# Patient Record
Sex: MALE | Race: WHITE | NOT HISPANIC OR LATINO
[De-identification: names, ages, dates, MRNs, and addresses within clinical notes are randomized per-mention and may not be internally consistent; named-entity substitution may affect disease eponyms.]

---

## 2017-12-05 ENCOUNTER — HOSPITAL (OUTPATIENT)
Dept: OTHER | Age: 47
End: 2017-12-05
Attending: FAMILY MEDICINE

## 2018-05-15 RX ORDER — SIMVASTATIN 10 MG
10 TABLET ORAL NIGHTLY
COMMUNITY

## 2018-05-15 RX ORDER — MULTIVIT-MIN/IRON FUM/FOLIC AC 7.5 MG-4
1 TABLET ORAL DAILY
COMMUNITY

## 2018-05-16 ENCOUNTER — LAB ENCOUNTER (OUTPATIENT)
Dept: LAB | Facility: HOSPITAL | Age: 48
End: 2018-05-16
Attending: ORTHOPAEDIC SURGERY
Payer: OTHER MISCELLANEOUS

## 2018-05-16 ENCOUNTER — HOSPITAL ENCOUNTER (OUTPATIENT)
Dept: GENERAL RADIOLOGY | Facility: HOSPITAL | Age: 48
Discharge: HOME | End: 2018-05-16
Attending: ORTHOPAEDIC SURGERY
Payer: OTHER MISCELLANEOUS

## 2018-05-16 DIAGNOSIS — M48.061 LUMBAR SPINAL STENOSIS: ICD-10-CM

## 2018-05-16 DIAGNOSIS — M54.17 LUMBOSACRAL NEURITIS: ICD-10-CM

## 2018-05-16 DIAGNOSIS — M54.17 LUMBOSACRAL NEURITIS: Primary | ICD-10-CM

## 2018-05-16 DIAGNOSIS — M43.17 ACQUIRED SPONDYLOLISTHESIS OF LUMBOSACRAL REGION: ICD-10-CM

## 2018-05-16 PROCEDURE — 93005 ELECTROCARDIOGRAM TRACING: CPT

## 2018-05-16 PROCEDURE — 86850 RBC ANTIBODY SCREEN: CPT

## 2018-05-16 PROCEDURE — 85730 THROMBOPLASTIN TIME PARTIAL: CPT

## 2018-05-16 PROCEDURE — 85025 COMPLETE CBC W/AUTO DIFF WBC: CPT

## 2018-05-16 PROCEDURE — 71046 X-RAY EXAM CHEST 2 VIEWS: CPT | Performed by: ORTHOPAEDIC SURGERY

## 2018-05-16 PROCEDURE — 81001 URINALYSIS AUTO W/SCOPE: CPT

## 2018-05-16 PROCEDURE — 86900 BLOOD TYPING SEROLOGIC ABO: CPT

## 2018-05-16 PROCEDURE — 85610 PROTHROMBIN TIME: CPT

## 2018-05-16 PROCEDURE — 87081 CULTURE SCREEN ONLY: CPT

## 2018-05-16 PROCEDURE — 80053 COMPREHEN METABOLIC PANEL: CPT

## 2018-05-16 PROCEDURE — 93010 ELECTROCARDIOGRAM REPORT: CPT | Performed by: ORTHOPAEDIC SURGERY

## 2018-05-16 PROCEDURE — 83036 HEMOGLOBIN GLYCOSYLATED A1C: CPT

## 2018-05-16 PROCEDURE — 36415 COLL VENOUS BLD VENIPUNCTURE: CPT

## 2018-05-16 PROCEDURE — 86901 BLOOD TYPING SEROLOGIC RH(D): CPT

## 2018-05-22 ENCOUNTER — OFFICE VISIT (OUTPATIENT)
Dept: INTERNAL MEDICINE CLINIC | Facility: CLINIC | Age: 48
End: 2018-05-22

## 2018-05-22 VITALS
DIASTOLIC BLOOD PRESSURE: 98 MMHG | WEIGHT: 258 LBS | HEART RATE: 114 BPM | SYSTOLIC BLOOD PRESSURE: 162 MMHG | BODY MASS INDEX: 33.11 KG/M2 | HEIGHT: 74 IN

## 2018-05-22 DIAGNOSIS — I10 ESSENTIAL HYPERTENSION: ICD-10-CM

## 2018-05-22 DIAGNOSIS — Z01.818 PREOPERATIVE EXAMINATION: Primary | ICD-10-CM

## 2018-05-22 DIAGNOSIS — E11.69 DYSLIPIDEMIA ASSOCIATED WITH TYPE 2 DIABETES MELLITUS (HCC): ICD-10-CM

## 2018-05-22 DIAGNOSIS — M48.061 SPINAL STENOSIS OF LUMBAR REGION, UNSPECIFIED WHETHER NEUROGENIC CLAUDICATION PRESENT: ICD-10-CM

## 2018-05-22 DIAGNOSIS — N20.0 RECURRENT KIDNEY STONES: ICD-10-CM

## 2018-05-22 DIAGNOSIS — E11.9 TYPE 2 DIABETES MELLITUS WITHOUT COMPLICATION, WITHOUT LONG-TERM CURRENT USE OF INSULIN (HCC): ICD-10-CM

## 2018-05-22 DIAGNOSIS — E78.5 DYSLIPIDEMIA ASSOCIATED WITH TYPE 2 DIABETES MELLITUS (HCC): ICD-10-CM

## 2018-05-22 PROCEDURE — 99244 OFF/OP CNSLTJ NEW/EST MOD 40: CPT | Performed by: INTERNAL MEDICINE

## 2018-05-22 PROCEDURE — 99212 OFFICE O/P EST SF 10 MIN: CPT | Performed by: INTERNAL MEDICINE

## 2018-05-22 RX ORDER — CYCLOBENZAPRINE HYDROCHLORIDE 7.5 MG/1
7.5 TABLET, FILM COATED ORAL EVERY 8 HOURS
COMMUNITY

## 2018-05-22 RX ORDER — OMEPRAZOLE 20 MG/1
20 CAPSULE, DELAYED RELEASE ORAL
COMMUNITY

## 2018-05-22 RX ORDER — MELOXICAM 15 MG/1
15 TABLET ORAL DAILY
Status: ON HOLD | COMMUNITY
End: 2018-06-03

## 2018-05-22 RX ORDER — AMLODIPINE BESYLATE 5 MG/1
1 TABLET ORAL DAILY
COMMUNITY
Start: 2018-05-14

## 2018-05-22 RX ORDER — TRAMADOL HYDROCHLORIDE 150 MG/1
1 CAPSULE, EXTENDED RELEASE ORAL 2 TIMES DAILY
Refills: 0 | Status: ON HOLD | COMMUNITY
Start: 2018-04-30 | End: 2018-06-03

## 2018-05-22 RX ORDER — VALSARTAN AND HYDROCHLOROTHIAZIDE 320; 25 MG/1; MG/1
1 TABLET, FILM COATED ORAL DAILY
COMMUNITY
Start: 2018-03-27

## 2018-05-22 NOTE — PATIENT INSTRUCTIONS
Please stop meloxicam and avoid aspirin and other anti-inflammatories prior to surgery. Please increase amlodipine to 5 mg 2 pills daily. Please try to stop smoking. Medically stable for upcoming lumbar spine surgery.   Follow-up with Dr. Brenda Oconnor postop

## 2018-05-22 NOTE — H&P
Migdalia Nick is a 56year old male smoker with a history of type 2 diabetes, hypertension, dyslipidemia and recurrent kidney stones who is seen today, at Dr. Ame Encinas request, for preoperative medical evaluation and clearance.   He is scheduled to un Outpatient Prescriptions:  Valsartan-Hydrochlorothiazide 320-25 MG Oral Tab Take 1 tablet by mouth daily. Disp:  Rfl:    AmLODIPine Besylate 5 MG Oral Tab Take 1 tablet by mouth daily.  Disp:  Rfl:    TraMADol HCl  MG Oral Capsule SR 24 Hr Take 1 caps pain  GI: Occasional heartburn.   No anorexia dysphagia nausea vomiting abdominal pain diarrhea constipation or rectal bleeding  : No urinary frequency dysuria or hematuria  MUSCULOSKELETAL: No leg swelling  NEURO: No headaches    EXAM:   GENERAL: Pleasan preoperative visit. Ongoing follow-up with usual PCP postoperatively. 2. Spinal stenosis of lumbar region, unspecified whether neurogenic claudication present  Anticipate posterior spinal fusion L5-S1 as above    3.  Type 2 diabetes mellitus without com

## 2018-05-29 ENCOUNTER — HOSPITAL ENCOUNTER (INPATIENT)
Facility: HOSPITAL | Age: 48
LOS: 5 days | Discharge: HOME HEALTH | DRG: 460 | End: 2018-06-03
Attending: ORTHOPAEDIC SURGERY | Admitting: ORTHOPAEDIC SURGERY
Payer: OTHER MISCELLANEOUS

## 2018-05-29 ENCOUNTER — SURGERY (OUTPATIENT)
Age: 48
End: 2018-05-29

## 2018-05-29 ENCOUNTER — ANESTHESIA (OUTPATIENT)
Dept: SURGERY | Facility: HOSPITAL | Age: 48
DRG: 460 | End: 2018-05-29
Payer: OTHER MISCELLANEOUS

## 2018-05-29 ENCOUNTER — ANESTHESIA EVENT (OUTPATIENT)
Dept: SURGERY | Facility: HOSPITAL | Age: 48
DRG: 460 | End: 2018-05-29
Payer: OTHER MISCELLANEOUS

## 2018-05-29 ENCOUNTER — APPOINTMENT (OUTPATIENT)
Dept: GENERAL RADIOLOGY | Facility: HOSPITAL | Age: 48
DRG: 460 | End: 2018-05-29
Attending: ORTHOPAEDIC SURGERY
Payer: OTHER MISCELLANEOUS

## 2018-05-29 DIAGNOSIS — G89.18 POSTOPERATIVE PAIN: ICD-10-CM

## 2018-05-29 DIAGNOSIS — M51.36 LUMBAR DEGENERATIVE DISC DISEASE: ICD-10-CM

## 2018-05-29 DIAGNOSIS — M48.061 LUMBAR SPINAL STENOSIS: Primary | ICD-10-CM

## 2018-05-29 DIAGNOSIS — M54.16 LUMBAR RADICULITIS: ICD-10-CM

## 2018-05-29 DIAGNOSIS — M53.2X6 LUMBAR SPINE INSTABILITY: ICD-10-CM

## 2018-05-29 PROCEDURE — BR1B1ZZ FLUOROSCOPY OF LUMBOSACRAL JOINT USING LOW OSMOLAR CONTRAST: ICD-10-PCS | Performed by: ORTHOPAEDIC SURGERY

## 2018-05-29 PROCEDURE — 82962 GLUCOSE BLOOD TEST: CPT

## 2018-05-29 PROCEDURE — 76001 XR C-ARM FLUORO >1 HOUR  (CPT=76001): CPT | Performed by: ORTHOPAEDIC SURGERY

## 2018-05-29 PROCEDURE — 4A11X4G MONITORING OF PERIPHERAL NERVOUS ELECTRICAL ACTIVITY, INTRAOPERATIVE, EXTERNAL APPROACH: ICD-10-PCS | Performed by: ORTHOPAEDIC SURGERY

## 2018-05-29 PROCEDURE — 01NB0ZZ RELEASE LUMBAR NERVE, OPEN APPROACH: ICD-10-PCS | Performed by: ORTHOPAEDIC SURGERY

## 2018-05-29 PROCEDURE — 99232 SBSQ HOSP IP/OBS MODERATE 35: CPT | Performed by: HOSPITALIST

## 2018-05-29 PROCEDURE — 0SG3071 FUSION OF LUMBOSACRAL JOINT WITH AUTOLOGOUS TISSUE SUBSTITUTE, POSTERIOR APPROACH, POSTERIOR COLUMN, OPEN APPROACH: ICD-10-PCS | Performed by: ORTHOPAEDIC SURGERY

## 2018-05-29 PROCEDURE — 72100 X-RAY EXAM L-S SPINE 2/3 VWS: CPT | Performed by: ORTHOPAEDIC SURGERY

## 2018-05-29 RX ORDER — DIPHENHYDRAMINE HCL 25 MG
25 CAPSULE ORAL EVERY 4 HOURS PRN
Status: DISCONTINUED | OUTPATIENT
Start: 2018-05-29 | End: 2018-06-03

## 2018-05-29 RX ORDER — BISACODYL 10 MG
10 SUPPOSITORY, RECTAL RECTAL
Status: DISCONTINUED | OUTPATIENT
Start: 2018-05-29 | End: 2018-06-03

## 2018-05-29 RX ORDER — VANCOMYCIN HYDROCHLORIDE 500 MG/10ML
INJECTION, POWDER, LYOPHILIZED, FOR SOLUTION INTRAVENOUS CONTINUOUS PRN
Status: DISCONTINUED | OUTPATIENT
Start: 2018-05-29 | End: 2018-05-29

## 2018-05-29 RX ORDER — INSULIN ASPART 100 [IU]/ML
INJECTION, SOLUTION INTRAVENOUS; SUBCUTANEOUS ONCE
Status: DISCONTINUED | OUTPATIENT
Start: 2018-05-29 | End: 2018-05-29

## 2018-05-29 RX ORDER — ONDANSETRON 2 MG/ML
4 INJECTION INTRAMUSCULAR; INTRAVENOUS EVERY 4 HOURS PRN
Status: ACTIVE | OUTPATIENT
Start: 2018-05-29 | End: 2018-05-30

## 2018-05-29 RX ORDER — POLYETHYLENE GLYCOL 3350 17 G/17G
17 POWDER, FOR SOLUTION ORAL DAILY PRN
Status: DISCONTINUED | OUTPATIENT
Start: 2018-05-29 | End: 2018-06-03

## 2018-05-29 RX ORDER — SODIUM CHLORIDE, SODIUM LACTATE, POTASSIUM CHLORIDE, CALCIUM CHLORIDE 600; 310; 30; 20 MG/100ML; MG/100ML; MG/100ML; MG/100ML
INJECTION, SOLUTION INTRAVENOUS CONTINUOUS
Status: DISCONTINUED | OUTPATIENT
Start: 2018-05-29 | End: 2018-05-29 | Stop reason: HOSPADM

## 2018-05-29 RX ORDER — DEXTROSE MONOHYDRATE 50 MG/ML
INJECTION, SOLUTION INTRAVENOUS CONTINUOUS
Status: DISCONTINUED | OUTPATIENT
Start: 2018-05-29 | End: 2018-06-03

## 2018-05-29 RX ORDER — DOCUSATE SODIUM 100 MG/1
100 CAPSULE, LIQUID FILLED ORAL 2 TIMES DAILY
Status: DISCONTINUED | OUTPATIENT
Start: 2018-05-29 | End: 2018-06-03

## 2018-05-29 RX ORDER — METOCLOPRAMIDE HYDROCHLORIDE 5 MG/ML
10 INJECTION INTRAMUSCULAR; INTRAVENOUS EVERY 6 HOURS PRN
Status: DISCONTINUED | OUTPATIENT
Start: 2018-05-29 | End: 2018-06-03

## 2018-05-29 RX ORDER — CEFAZOLIN SODIUM/WATER 2 G/20 ML
2 SYRINGE (ML) INTRAVENOUS EVERY 8 HOURS
Status: COMPLETED | OUTPATIENT
Start: 2018-05-29 | End: 2018-05-29

## 2018-05-29 RX ORDER — DEXTROSE MONOHYDRATE 25 G/50ML
50 INJECTION, SOLUTION INTRAVENOUS AS NEEDED
Status: DISCONTINUED | OUTPATIENT
Start: 2018-05-29 | End: 2018-06-03

## 2018-05-29 RX ORDER — ONDANSETRON 2 MG/ML
4 INJECTION INTRAMUSCULAR; INTRAVENOUS EVERY 6 HOURS PRN
Status: DISCONTINUED | OUTPATIENT
Start: 2018-05-29 | End: 2018-06-03

## 2018-05-29 RX ORDER — AMLODIPINE BESYLATE 5 MG/1
5 TABLET ORAL DAILY
Status: DISCONTINUED | OUTPATIENT
Start: 2018-05-30 | End: 2018-06-03

## 2018-05-29 RX ORDER — HYDROCODONE BITARTRATE AND ACETAMINOPHEN 5; 325 MG/1; MG/1
2 TABLET ORAL AS NEEDED
Status: DISCONTINUED | OUTPATIENT
Start: 2018-05-29 | End: 2018-05-29 | Stop reason: HOSPADM

## 2018-05-29 RX ORDER — METOCLOPRAMIDE 10 MG/1
10 TABLET ORAL ONCE
Status: COMPLETED | OUTPATIENT
Start: 2018-05-29 | End: 2018-05-29

## 2018-05-29 RX ORDER — HYDROCODONE BITARTRATE AND ACETAMINOPHEN 5; 325 MG/1; MG/1
1 TABLET ORAL AS NEEDED
Status: DISCONTINUED | OUTPATIENT
Start: 2018-05-29 | End: 2018-05-29 | Stop reason: HOSPADM

## 2018-05-29 RX ORDER — HYDROMORPHONE HYDROCHLORIDE 1 MG/ML
0.2 INJECTION, SOLUTION INTRAMUSCULAR; INTRAVENOUS; SUBCUTANEOUS EVERY 2 HOUR PRN
Status: DISCONTINUED | OUTPATIENT
Start: 2018-05-29 | End: 2018-06-03

## 2018-05-29 RX ORDER — CYCLOBENZAPRINE HCL 10 MG
10 TABLET ORAL 3 TIMES DAILY PRN
Status: DISCONTINUED | OUTPATIENT
Start: 2018-05-29 | End: 2018-06-03

## 2018-05-29 RX ORDER — CYCLOBENZAPRINE HCL 5 MG
7.5 TABLET ORAL 3 TIMES DAILY PRN
Status: DISCONTINUED | OUTPATIENT
Start: 2018-05-29 | End: 2018-06-03

## 2018-05-29 RX ORDER — ACETAMINOPHEN 500 MG
1000 TABLET ORAL ONCE
Status: COMPLETED | OUTPATIENT
Start: 2018-05-29 | End: 2018-05-29

## 2018-05-29 RX ORDER — ACETAMINOPHEN 325 MG/1
650 TABLET ORAL EVERY 4 HOURS PRN
Status: DISCONTINUED | OUTPATIENT
Start: 2018-05-29 | End: 2018-05-31

## 2018-05-29 RX ORDER — VALSARTAN AND HYDROCHLOROTHIAZIDE 320; 25 MG/1; MG/1
1 TABLET, FILM COATED ORAL DAILY
Status: DISCONTINUED | OUTPATIENT
Start: 2018-05-30 | End: 2018-05-29 | Stop reason: SDUPTHER

## 2018-05-29 RX ORDER — CEFAZOLIN SODIUM/WATER 2 G/20 ML
2 SYRINGE (ML) INTRAVENOUS ONCE
Status: COMPLETED | OUTPATIENT
Start: 2018-05-29 | End: 2018-05-29

## 2018-05-29 RX ORDER — MIDAZOLAM HYDROCHLORIDE 1 MG/ML
INJECTION INTRAMUSCULAR; INTRAVENOUS AS NEEDED
Status: DISCONTINUED | OUTPATIENT
Start: 2018-05-29 | End: 2018-05-29 | Stop reason: SURG

## 2018-05-29 RX ORDER — ACETAMINOPHEN 500 MG
1000 TABLET ORAL ONCE
Status: DISCONTINUED | OUTPATIENT
Start: 2018-05-29 | End: 2018-05-29 | Stop reason: HOSPADM

## 2018-05-29 RX ORDER — ZOLPIDEM TARTRATE 5 MG/1
5 TABLET ORAL NIGHTLY PRN
Status: DISCONTINUED | OUTPATIENT
Start: 2018-05-29 | End: 2018-06-03

## 2018-05-29 RX ORDER — HYDROMORPHONE HYDROCHLORIDE 1 MG/ML
0.6 INJECTION, SOLUTION INTRAMUSCULAR; INTRAVENOUS; SUBCUTANEOUS EVERY 5 MIN PRN
Status: DISCONTINUED | OUTPATIENT
Start: 2018-05-29 | End: 2018-05-29 | Stop reason: HOSPADM

## 2018-05-29 RX ORDER — HYDROCODONE BITARTRATE AND ACETAMINOPHEN 10; 325 MG/1; MG/1
1 TABLET ORAL EVERY 4 HOURS PRN
Status: DISCONTINUED | OUTPATIENT
Start: 2018-05-29 | End: 2018-05-31

## 2018-05-29 RX ORDER — FAMOTIDINE 20 MG/1
20 TABLET ORAL ONCE
Status: COMPLETED | OUTPATIENT
Start: 2018-05-29 | End: 2018-05-29

## 2018-05-29 RX ORDER — HYDROMORPHONE HYDROCHLORIDE 1 MG/ML
0.8 INJECTION, SOLUTION INTRAMUSCULAR; INTRAVENOUS; SUBCUTANEOUS EVERY 2 HOUR PRN
Status: DISCONTINUED | OUTPATIENT
Start: 2018-05-29 | End: 2018-06-03

## 2018-05-29 RX ORDER — DIPHENHYDRAMINE HYDROCHLORIDE 50 MG/ML
25 INJECTION INTRAMUSCULAR; INTRAVENOUS EVERY 4 HOURS PRN
Status: DISCONTINUED | OUTPATIENT
Start: 2018-05-29 | End: 2018-06-03

## 2018-05-29 RX ORDER — DIPHENHYDRAMINE HYDROCHLORIDE 50 MG/ML
12.5 INJECTION INTRAMUSCULAR; INTRAVENOUS EVERY 4 HOURS PRN
Status: DISCONTINUED | OUTPATIENT
Start: 2018-05-29 | End: 2018-06-03

## 2018-05-29 RX ORDER — NALBUPHINE HCL 10 MG/ML
2.5 AMPUL (ML) INJECTION EVERY 4 HOURS PRN
Status: DISCONTINUED | OUTPATIENT
Start: 2018-05-29 | End: 2018-06-03

## 2018-05-29 RX ORDER — SODIUM PHOSPHATE, DIBASIC AND SODIUM PHOSPHATE, MONOBASIC 7; 19 G/133ML; G/133ML
1 ENEMA RECTAL ONCE AS NEEDED
Status: DISCONTINUED | OUTPATIENT
Start: 2018-05-29 | End: 2018-06-03

## 2018-05-29 RX ORDER — HYDROMORPHONE HYDROCHLORIDE 1 MG/ML
0.4 INJECTION, SOLUTION INTRAMUSCULAR; INTRAVENOUS; SUBCUTANEOUS EVERY 5 MIN PRN
Status: DISCONTINUED | OUTPATIENT
Start: 2018-05-29 | End: 2018-05-29 | Stop reason: HOSPADM

## 2018-05-29 RX ORDER — NALOXONE HYDROCHLORIDE 0.4 MG/ML
0.08 INJECTION, SOLUTION INTRAMUSCULAR; INTRAVENOUS; SUBCUTANEOUS
Status: DISCONTINUED | OUTPATIENT
Start: 2018-05-29 | End: 2018-06-03

## 2018-05-29 RX ORDER — HYDROMORPHONE HYDROCHLORIDE 1 MG/ML
0.2 INJECTION, SOLUTION INTRAMUSCULAR; INTRAVENOUS; SUBCUTANEOUS EVERY 5 MIN PRN
Status: DISCONTINUED | OUTPATIENT
Start: 2018-05-29 | End: 2018-05-29 | Stop reason: HOSPADM

## 2018-05-29 RX ORDER — PANTOPRAZOLE SODIUM 20 MG/1
20 TABLET, DELAYED RELEASE ORAL
Status: DISCONTINUED | OUTPATIENT
Start: 2018-05-30 | End: 2018-06-03

## 2018-05-29 RX ORDER — SODIUM CHLORIDE, SODIUM LACTATE, POTASSIUM CHLORIDE, CALCIUM CHLORIDE 600; 310; 30; 20 MG/100ML; MG/100ML; MG/100ML; MG/100ML
INJECTION, SOLUTION INTRAVENOUS CONTINUOUS
Status: DISCONTINUED | OUTPATIENT
Start: 2018-05-29 | End: 2018-06-03

## 2018-05-29 RX ORDER — HYDROCODONE BITARTRATE AND ACETAMINOPHEN 10; 325 MG/1; MG/1
2 TABLET ORAL EVERY 4 HOURS PRN
Status: DISCONTINUED | OUTPATIENT
Start: 2018-05-29 | End: 2018-05-31

## 2018-05-29 RX ORDER — ONDANSETRON 2 MG/ML
INJECTION INTRAMUSCULAR; INTRAVENOUS AS NEEDED
Status: DISCONTINUED | OUTPATIENT
Start: 2018-05-29 | End: 2018-05-29 | Stop reason: SURG

## 2018-05-29 RX ORDER — SENNOSIDES 8.6 MG
17.2 TABLET ORAL NIGHTLY
Status: DISCONTINUED | OUTPATIENT
Start: 2018-05-29 | End: 2018-06-03

## 2018-05-29 RX ORDER — HYDROMORPHONE HYDROCHLORIDE 1 MG/ML
0.4 INJECTION, SOLUTION INTRAMUSCULAR; INTRAVENOUS; SUBCUTANEOUS EVERY 30 MIN PRN
Status: DISCONTINUED | OUTPATIENT
Start: 2018-05-29 | End: 2018-06-03

## 2018-05-29 RX ORDER — ROCURONIUM BROMIDE 10 MG/ML
INJECTION, SOLUTION INTRAVENOUS AS NEEDED
Status: DISCONTINUED | OUTPATIENT
Start: 2018-05-29 | End: 2018-05-29 | Stop reason: SURG

## 2018-05-29 RX ORDER — NALOXONE HYDROCHLORIDE 0.4 MG/ML
80 INJECTION, SOLUTION INTRAMUSCULAR; INTRAVENOUS; SUBCUTANEOUS AS NEEDED
Status: DISCONTINUED | OUTPATIENT
Start: 2018-05-29 | End: 2018-05-29 | Stop reason: HOSPADM

## 2018-05-29 RX ORDER — HALOPERIDOL 5 MG/ML
0.25 INJECTION INTRAMUSCULAR ONCE AS NEEDED
Status: DISCONTINUED | OUTPATIENT
Start: 2018-05-29 | End: 2018-05-29 | Stop reason: HOSPADM

## 2018-05-29 RX ORDER — PHENYLEPHRINE HCL 10 MG/ML
VIAL (ML) INJECTION AS NEEDED
Status: DISCONTINUED | OUTPATIENT
Start: 2018-05-29 | End: 2018-05-29 | Stop reason: SURG

## 2018-05-29 RX ORDER — HYDROMORPHONE HYDROCHLORIDE 1 MG/ML
0.4 INJECTION, SOLUTION INTRAMUSCULAR; INTRAVENOUS; SUBCUTANEOUS EVERY 2 HOUR PRN
Status: DISCONTINUED | OUTPATIENT
Start: 2018-05-29 | End: 2018-06-03

## 2018-05-29 RX ORDER — LIDOCAINE HYDROCHLORIDE 10 MG/ML
INJECTION, SOLUTION EPIDURAL; INFILTRATION; INTRACAUDAL; PERINEURAL AS NEEDED
Status: DISCONTINUED | OUTPATIENT
Start: 2018-05-29 | End: 2018-05-29 | Stop reason: SURG

## 2018-05-29 RX ORDER — DEXTROSE MONOHYDRATE 25 G/50ML
50 INJECTION, SOLUTION INTRAVENOUS
Status: DISCONTINUED | OUTPATIENT
Start: 2018-05-29 | End: 2018-05-29

## 2018-05-29 RX ORDER — SODIUM CHLORIDE 9 MG/ML
INJECTION, SOLUTION INTRAVENOUS
Status: COMPLETED
Start: 2018-05-29 | End: 2018-05-29

## 2018-05-29 RX ORDER — ONDANSETRON 2 MG/ML
4 INJECTION INTRAMUSCULAR; INTRAVENOUS ONCE AS NEEDED
Status: DISCONTINUED | OUTPATIENT
Start: 2018-05-29 | End: 2018-05-29 | Stop reason: HOSPADM

## 2018-05-29 RX ORDER — LIDOCAINE HYDROCHLORIDE 40 MG/ML
SOLUTION TOPICAL AS NEEDED
Status: DISCONTINUED | OUTPATIENT
Start: 2018-05-29 | End: 2018-05-29 | Stop reason: SURG

## 2018-05-29 RX ORDER — INSULIN ASPART 100 [IU]/ML
INJECTION, SOLUTION INTRAVENOUS; SUBCUTANEOUS ONCE
Status: DISCONTINUED | OUTPATIENT
Start: 2018-05-29 | End: 2018-05-30 | Stop reason: ALTCHOICE

## 2018-05-29 RX ADMIN — MIDAZOLAM HYDROCHLORIDE 2 MG: 1 INJECTION INTRAMUSCULAR; INTRAVENOUS at 07:34:00

## 2018-05-29 RX ADMIN — HYDROMORPHONE HYDROCHLORIDE 0.4 MG: 1 INJECTION, SOLUTION INTRAMUSCULAR; INTRAVENOUS; SUBCUTANEOUS at 09:28:00

## 2018-05-29 RX ADMIN — ONDANSETRON 4 MG: 2 INJECTION INTRAMUSCULAR; INTRAVENOUS at 09:22:00

## 2018-05-29 RX ADMIN — SODIUM CHLORIDE, SODIUM LACTATE, POTASSIUM CHLORIDE, CALCIUM CHLORIDE: 600; 310; 30; 20 INJECTION, SOLUTION INTRAVENOUS at 09:35:00

## 2018-05-29 RX ADMIN — CEFAZOLIN SODIUM/WATER 2 G: 2 G/20 ML SYRINGE (ML) INTRAVENOUS at 07:43:00

## 2018-05-29 RX ADMIN — PHENYLEPHRINE HCL 100 MCG: 10 MG/ML VIAL (ML) INJECTION at 08:25:00

## 2018-05-29 RX ADMIN — SODIUM CHLORIDE, SODIUM LACTATE, POTASSIUM CHLORIDE, CALCIUM CHLORIDE: 600; 310; 30; 20 INJECTION, SOLUTION INTRAVENOUS at 07:34:00

## 2018-05-29 RX ADMIN — PHENYLEPHRINE HCL 100 MCG: 10 MG/ML VIAL (ML) INJECTION at 08:18:00

## 2018-05-29 RX ADMIN — SODIUM CHLORIDE, SODIUM LACTATE, POTASSIUM CHLORIDE, CALCIUM CHLORIDE: 600; 310; 30; 20 INJECTION, SOLUTION INTRAVENOUS at 10:02:00

## 2018-05-29 RX ADMIN — LIDOCAINE HYDROCHLORIDE 50 MG: 10 INJECTION, SOLUTION EPIDURAL; INFILTRATION; INTRACAUDAL; PERINEURAL at 07:37:00

## 2018-05-29 RX ADMIN — ROCURONIUM BROMIDE 10 MG: 10 INJECTION, SOLUTION INTRAVENOUS at 07:37:00

## 2018-05-29 RX ADMIN — PHENYLEPHRINE HCL 200 MCG: 10 MG/ML VIAL (ML) INJECTION at 09:10:00

## 2018-05-29 RX ADMIN — HYDROMORPHONE HYDROCHLORIDE 0.2 MG: 1 INJECTION, SOLUTION INTRAMUSCULAR; INTRAVENOUS; SUBCUTANEOUS at 08:10:00

## 2018-05-29 RX ADMIN — HYDROMORPHONE HYDROCHLORIDE 0.2 MG: 1 INJECTION, SOLUTION INTRAMUSCULAR; INTRAVENOUS; SUBCUTANEOUS at 08:06:00

## 2018-05-29 RX ADMIN — PHENYLEPHRINE HCL 200 MCG: 10 MG/ML VIAL (ML) INJECTION at 08:45:00

## 2018-05-29 RX ADMIN — PHENYLEPHRINE HCL 100 MCG: 10 MG/ML VIAL (ML) INJECTION at 08:30:00

## 2018-05-29 RX ADMIN — LIDOCAINE HYDROCHLORIDE 4 ML: 40 SOLUTION TOPICAL at 07:37:00

## 2018-05-29 RX ADMIN — HYDROMORPHONE HYDROCHLORIDE 0.2 MG: 1 INJECTION, SOLUTION INTRAMUSCULAR; INTRAVENOUS; SUBCUTANEOUS at 08:00:00

## 2018-05-29 RX ADMIN — SODIUM CHLORIDE, SODIUM LACTATE, POTASSIUM CHLORIDE, CALCIUM CHLORIDE: 600; 310; 30; 20 INJECTION, SOLUTION INTRAVENOUS at 08:15:00

## 2018-05-29 RX ADMIN — ROCURONIUM BROMIDE 10 MG: 10 INJECTION, SOLUTION INTRAVENOUS at 08:10:00

## 2018-05-29 RX ADMIN — PHENYLEPHRINE HCL 100 MCG: 10 MG/ML VIAL (ML) INJECTION at 08:38:00

## 2018-05-29 RX ADMIN — HYDROMORPHONE HYDROCHLORIDE 0.2 MG: 1 INJECTION, SOLUTION INTRAMUSCULAR; INTRAVENOUS; SUBCUTANEOUS at 09:30:00

## 2018-05-29 NOTE — ANESTHESIA PREPROCEDURE EVALUATION
Anesthesia PreOp Note    HPI:     Shonda Vegas is a 56year old male who presents for preoperative consultation requested by: Ilya Ying MD    Date of Surgery: 5/29/2018    Procedure(s):  POSTERIOR LUMBAR LAMINECT SPINAL FUS W/INSTR 1 LEV  Indic meals. Take 2 pills twice daily Disp:  Rfl:  5/28/2018 at Unknown time   simvastatin 10 MG Oral Tab Take 10 mg by mouth nightly.  Disp:  Rfl:  5/28/2018 at Unknown time   Dulaglutide (TRULICITY) 1.5 AG/8.3ZS Subcutaneous Solution Pen-injector Inject into th Weight: 111.1 kg (245 lb)   Height: 1.905 m (6' 3\")        Anesthesia ROS/Med Hx and Physical Exam     Patient summary reviewed and Nursing notes reviewed    Airway   Mallampati: II  TM distance: >3 FB  Neck ROM: full  Dental    (+) partials    Pulmonar

## 2018-05-29 NOTE — PROGRESS NOTES
05/29/18 1345   Clinical Encounter Type   Visited With Patient and family together   Routine Visit Introduction   Continue Visiting Yes   Surgical Visit Post-op   Crisis Visit Critical care   Patient Spiritual Encounters   Spiritual Assessment Completed

## 2018-05-29 NOTE — ANESTHESIA POSTPROCEDURE EVALUATION
Patient: Omar Clifford    Procedure Summary     Date:  05/29/18 Room / Location:  [address] [place] [address] MAIN OR 09 / 300 [facility] MAIN OR    Anesthesia Start:  9476 Anesthesia Stop:  1003    Procedure:  POSTERIOR LUMBAR LAMINECT SPINAL FUS W/INSTR 1 LEV (N/A ) Diagnosis:  (lumb

## 2018-05-29 NOTE — INTERVAL H&P NOTE
Pre-op Diagnosis: lumbar spinal stenosis, lumbar degenerative disk disease, lumbar radiculitis, lumbar spine instability    The above referenced H&P was reviewed by Em Mitchell MD on 5/29/2018, the patient was examined and no significant changes have oc

## 2018-05-29 NOTE — H&P (VIEW-ONLY)
Diane Galarza is a 56year old male smoker with a history of type 2 diabetes, hypertension, dyslipidemia and recurrent kidney stones who is seen today, at Dr. Sven Macias request, for preoperative medical evaluation and clearance.   He is scheduled to un Outpatient Prescriptions:  Valsartan-Hydrochlorothiazide 320-25 MG Oral Tab Take 1 tablet by mouth daily. Disp:  Rfl:    AmLODIPine Besylate 5 MG Oral Tab Take 1 tablet by mouth daily.  Disp:  Rfl:    TraMADol HCl  MG Oral Capsule SR 24 Hr Take 1 caps pain  GI: Occasional heartburn.   No anorexia dysphagia nausea vomiting abdominal pain diarrhea constipation or rectal bleeding  : No urinary frequency dysuria or hematuria  MUSCULOSKELETAL: No leg swelling  NEURO: No headaches    EXAM:   GENERAL: Pleasan preoperative visit. Ongoing follow-up with usual PCP postoperatively. 2. Spinal stenosis of lumbar region, unspecified whether neurogenic claudication present  Anticipate posterior spinal fusion L5-S1 as above    3.  Type 2 diabetes mellitus without com

## 2018-05-29 NOTE — PROGRESS NOTES
[place] [facility]    Progress Note    [address] [facility] ED Patient Status:  Inpatient    10/16/1970 MRN [id number]   Location [facility] 2W/SW Attending Kenisha Hinds MD   Hosp Day # 0 PCP Lily Busch     Subjective:     Constitut CHECKS. Essential hypertension  CONT HOME MEDS, MONITOR.          Results:     Lab Results  Component Value Date   WBC 10.1 05/16/2018   HGB 17.7 (H) 05/16/2018   HCT 51.8 05/16/2018    05/16/2018   CREATSERUM 0.93 05/16/2018   BUN 22 (H) 05/16

## 2018-05-29 NOTE — OPERATIVE REPORT
[facility]    PATIENT'S NAME: [facility]   ATTENDING PHYSICIAN: Iliana Reaves MD   OPERATING PHYSICIAN: Ирина Cosme MD   PATIENT ACCOUNT#:   [id number]    LOCATION:  [address] [place] [address] #:   [id number]       DATE OF BIRTH Blanche Chapa for a neurosurgical opinion prior to surgery as well. He also was deemed medically stable by his primary care physician. He did elect for not only decompression but also stabilization with instrumentation due to his spondylolisthesis.   He were then dissected out bilaterally. An [place] retractor was then placed in the wound. A double-action rongeur was then used to remove the spinous processes of L5.   This autograft was then morselized by CHERELLE SALGUERO [facility] Giselle and mixed with demineralized b penetration. Four 7.5 x 45 mm pedicle screws were then inserted into the pedicles of L5 and S1 bilaterally. The S1 screws were in good bicortical fixation fashion. Image intensification views looked very good. There were no SSEP or EMG changes noted. 13:28:14  [facility] [id number]  [facility]/

## 2018-05-30 ENCOUNTER — APPOINTMENT (OUTPATIENT)
Dept: GENERAL RADIOLOGY | Facility: HOSPITAL | Age: 48
DRG: 460 | End: 2018-05-30
Attending: ORTHOPAEDIC SURGERY
Payer: OTHER MISCELLANEOUS

## 2018-05-30 PROCEDURE — 99233 SBSQ HOSP IP/OBS HIGH 50: CPT | Performed by: HOSPITALIST

## 2018-05-30 PROCEDURE — 72100 X-RAY EXAM L-S SPINE 2/3 VWS: CPT | Performed by: ORTHOPAEDIC SURGERY

## 2018-05-30 RX ORDER — SODIUM CHLORIDE 9 MG/ML
INJECTION, SOLUTION INTRAVENOUS
Status: COMPLETED
Start: 2018-05-30 | End: 2018-05-30

## 2018-05-30 RX ORDER — 0.9 % SODIUM CHLORIDE 0.9 %
VIAL (ML) INJECTION
Status: COMPLETED
Start: 2018-05-30 | End: 2018-05-30

## 2018-05-30 NOTE — OCCUPATIONAL THERAPY NOTE
OCCUPATIONAL THERAPY EVALUATION - INPATIENT      Room Number: 215/215-A  Evaluation Date: 5/30/2018  Type of Evaluation: Initial  Presenting Problem:  (post lumbar lami/spinal fusion )    Physician Order: IP Consult to Occupational Therapy  Reason for Ther stones    • Type 2 diabetes mellitus ([facility]        Past Surgical History  Past Surgical History:  2000: AMPUTATION FINGER/THUMB      Comment: Right index finger  Infancy: INGUINAL HERNIA REPAIR Bilateral  1990s: KNEE ARTHROSCOPY Right  2005: LITHOTRIPSY Score (AM-PAC Scale): 34.69  CMS Modifier (G-Code): CK    FUNCTIONAL TRANSFER ASSESSMENT  Supine to Sit : Moderate assistance  Sit to Stand: Not tested  Toilet Transfer: NT  Shower Transfer: NT  Chair Transfer: NT  Car Transfer: NT    Bedroom Mobility: NT

## 2018-05-30 NOTE — PROGRESS NOTES
[address] [facility]    Wt Readings from Last 6 Encounters:  05/29/18 : 250 lb (113.4 kg)  05/22/18 : 258 lb (6 kg)    56year old  Surgical/Procedural Cases on this Admission     Case IDs Date Procedure Surgeon Location Status    [id number] 5/29/18 POSTERIOR ml   Net           -177.5 ml         Patient has good strength in the lower extremities including hip flexors, quadriceps, hamstrings, ankle dorsi/plantar flexors and great toe extensors.  Pt has sensation which is in tact to light touch versus pin prick th

## 2018-05-30 NOTE — PHYSICAL THERAPY NOTE
PHYSICAL THERAPY EVALUATION - INPATIENT     Room Number: 215/215-A  Evaluation Date: 5/30/2018  Type of Evaluation: Initial   Physician Order: PT Eval and Treat    Presenting Problem: p/w lumbar spine stenosis, s/p L5-S1 PSF  Reason for Therapy: Mobility preparation for discharge. DISCHARGE RECOMMENDATIONS  PT Discharge Recommendations: 24 hour care/supervision;Sub-acute rehabilitation;Home;Outpatient PT    PLAN  PT Treatment Plan: Bed mobility; Body mechanics; Endurance; Energy conservation;Patient educat ASSESSMENT  Ratin  Location: low back  Management Techniques: Activity promotion; Body mechanics;Breathing techniques;Repositioning    COGNITION  · Overall Cognitive Status:  WFL - within functional limits    RANGE OF MOTION AND STRENGTH ASSESSMENT  Upp Not tested  Comment : pt tolerated seated EOB ~7 min with HR increasing to 145 bpm, deferred transfer/ambulation assessment    Bed Mobility: supine <> sit transfers with log roll technique at mod A x 2, verbal cues for sequencing    Transfers: not assessed

## 2018-05-30 NOTE — PLAN OF CARE
Problem: Diabetes/Glucose Control  Goal: Glucose maintained within prescribed range  INTERVENTIONS:  - Monitor Blood Glucose as ordered  - Assess for signs and symptoms of hyperglycemia and hypoglycemia  - Administer ordered medications to maintain glucose Implement wound care per orders  - Initiate isolation precautions as appropriate  - Initiate Pressure Ulcer prevention bundle as indicated   Outcome: Progressing  Incision as charted. Covered with Surgical DRSG.     Problem: MUSCULOSKELETAL - ADULT  Goal: R FALL  Goal: Free from fall injury  INTERVENTIONS:  - Assess pt frequently for physical needs  - Identify cognitive and physical deficits and behaviors that affect risk of falls.   - Orange fall precautions as indicated by assessment.  - Educate pt/family

## 2018-05-30 NOTE — OCCUPATIONAL THERAPY NOTE
Chart reviewed. Spoke with RN - patient on bedrest until x-ray which is scheduled for 10am. Will follow up as medically appropriate. RN aware.

## 2018-05-30 NOTE — PROGRESS NOTES
[place] [facility] - [facility]    Progress Note    [address] [facility] Patient Status:  Inpatient    10/16/1970 MRN [id number]   Location [facility] 2W/SW Attending Kb Phelps MD   Hosp Day # 1 PCP Leon Isaac     Subjective:   Subjective: 05/16/2018   PTT 25.1 05/16/2018   INR 1.0 05/16/2018       Xr Lumbar Spine (min 2 Views) (cpt=72100)    Result Date: 5/30/2018  CONCLUSION:  1. L5-S1: Bilateral L5 pars defects. Grade 1 spondylolisthesis. Decompressive laminectomy and posterior fusion.

## 2018-05-31 ENCOUNTER — APPOINTMENT (OUTPATIENT)
Dept: CT IMAGING | Facility: HOSPITAL | Age: 48
DRG: 460 | End: 2018-05-31
Attending: HOSPITALIST
Payer: OTHER MISCELLANEOUS

## 2018-05-31 PROCEDURE — 71260 CT THORAX DX C+: CPT | Performed by: HOSPITALIST

## 2018-05-31 PROCEDURE — 99233 SBSQ HOSP IP/OBS HIGH 50: CPT | Performed by: HOSPITALIST

## 2018-05-31 RX ORDER — TRAMADOL HYDROCHLORIDE 50 MG/1
100 TABLET ORAL EVERY 6 HOURS PRN
Status: DISCONTINUED | OUTPATIENT
Start: 2018-05-31 | End: 2018-06-03

## 2018-05-31 RX ORDER — DIAZEPAM 10 MG/1
10 TABLET ORAL EVERY 6 HOURS PRN
Status: DISCONTINUED | OUTPATIENT
Start: 2018-05-31 | End: 2018-06-03

## 2018-05-31 RX ORDER — OXYCODONE AND ACETAMINOPHEN 10; 325 MG/1; MG/1
1 TABLET ORAL EVERY 4 HOURS PRN
Status: DISCONTINUED | OUTPATIENT
Start: 2018-05-31 | End: 2018-05-31

## 2018-05-31 RX ORDER — HYDROCODONE BITARTRATE AND ACETAMINOPHEN 10; 325 MG/1; MG/1
2 TABLET ORAL EVERY 4 HOURS PRN
Status: DISCONTINUED | OUTPATIENT
Start: 2018-05-31 | End: 2018-06-01

## 2018-05-31 RX ORDER — ACETAMINOPHEN 325 MG/1
650 TABLET ORAL EVERY 4 HOURS PRN
Status: DISCONTINUED | OUTPATIENT
Start: 2018-05-31 | End: 2018-06-03

## 2018-05-31 RX ORDER — OXYCODONE AND ACETAMINOPHEN 10; 325 MG/1; MG/1
2 TABLET ORAL EVERY 6 HOURS PRN
Status: DISCONTINUED | OUTPATIENT
Start: 2018-05-31 | End: 2018-06-01

## 2018-05-31 RX ORDER — 0.9 % SODIUM CHLORIDE 0.9 %
VIAL (ML) INJECTION
Status: COMPLETED
Start: 2018-05-31 | End: 2018-05-31

## 2018-05-31 RX ORDER — OXYCODONE AND ACETAMINOPHEN 10; 325 MG/1; MG/1
2 TABLET ORAL EVERY 6 HOURS PRN
Status: DISCONTINUED | OUTPATIENT
Start: 2018-05-31 | End: 2018-05-31

## 2018-05-31 NOTE — CM/SW NOTE
SW met with pt at bedside to discuss discharge planning. Pt lives in [facility] with no stairs. Pt lives with his girlfriend who will be available to assist pt upon discharge when she is not working full time.   Prior to this admission pt was independent with AD

## 2018-05-31 NOTE — CHRONIC PAIN
[facility]  Report of Consultation    Aiden Roxi Patient Status:  Inpatient    10/16/1970 MRN [id number]   Location [facility] 4W/SW/SE Attending Berenice Maria MD   Hosp Day # 2 PCP Yuri Roque     Date of Admission Oral, BID  •  PEG 3350 (MIRALAX) powder packet 17 g, 17 g, Oral, Daily PRN  •  magnesium hydroxide (MILK OF MAGNESIA) 400 MG/5ML suspension 30 mL, 30 mL, Oral, Daily PRN  •  bisacodyl (DULCOLAX) rectal suppository 10 mg, 10 mg, Rectal, Daily PRN  •  FLEET (DEX-4) 4-0.006 g chewable tab 4 tablet, 4 tablet, Oral, Q15 Min PRN  •  glucose (DEX4) oral liquid 15 g, 15 g, Oral, Q15 Min PRN  •  Insulin Aspart Pen (NOVOLOG) 100 UNIT/ML flexpen 1-7 Units, 1-7 Units, Subcutaneous, TID CC  •  valsartan (DIOVAN) 320 mg,

## 2018-05-31 NOTE — PHYSICAL THERAPY NOTE
Due to the pain and inc HR to 124 bpm AM session limited to bed mobility positioning for pt comfort ,there ex . PM-pt refused. MD aware.

## 2018-05-31 NOTE — PROGRESS NOTES
[place] [facility] - [facility]    Progress Note    [address] [facility] Patient Status:  Inpatient    10/16/1970 MRN [id number]   Location [facility] 2W/SW Attending Td Reeder MD   [facility] Day # 2 PCP Ellie Lopes     Subjective:   Subjective: 4.8 05/16/2018   ALKPHO 71 05/16/2018   BILT 0.7 05/16/2018   TP 7.8 05/16/2018   AST 19 05/16/2018   ALT 31 05/16/2018   PTT 25.1 05/16/2018   INR 1.0 05/16/2018       Xr Lumbar Spine (min 2 Views) (cpt=72100)    Result Date: 5/30/2018  CONCLUSION:  1.  L5

## 2018-05-31 NOTE — PLAN OF CARE
Diabetes/Glucose Control    • Glucose maintained within prescribed range Not Progressing        MUSCULOSKELETAL - ADULT    • Return mobility to safest level of function Not Progressing    • Maintain proper alignment of affected body part Not Progressing

## 2018-06-01 ENCOUNTER — APPOINTMENT (OUTPATIENT)
Dept: MRI IMAGING | Facility: HOSPITAL | Age: 48
DRG: 460 | End: 2018-06-01
Attending: ORTHOPAEDIC SURGERY
Payer: OTHER MISCELLANEOUS

## 2018-06-01 PROCEDURE — 72148 MRI LUMBAR SPINE W/O DYE: CPT | Performed by: ORTHOPAEDIC SURGERY

## 2018-06-01 PROCEDURE — 99233 SBSQ HOSP IP/OBS HIGH 50: CPT | Performed by: HOSPITALIST

## 2018-06-01 RX ORDER — 0.9 % SODIUM CHLORIDE 0.9 %
VIAL (ML) INJECTION
Status: COMPLETED
Start: 2018-06-01 | End: 2018-06-01

## 2018-06-01 RX ORDER — OXYCODONE HCL 10 MG/1
10 TABLET, FILM COATED, EXTENDED RELEASE ORAL EVERY 12 HOURS
Status: DISCONTINUED | OUTPATIENT
Start: 2018-06-01 | End: 2018-06-03

## 2018-06-01 RX ORDER — OXYCODONE AND ACETAMINOPHEN 10; 325 MG/1; MG/1
1 TABLET ORAL
Status: DISCONTINUED | OUTPATIENT
Start: 2018-06-01 | End: 2018-06-03

## 2018-06-01 RX ORDER — KETOROLAC TROMETHAMINE 15 MG/ML
15 INJECTION, SOLUTION INTRAMUSCULAR; INTRAVENOUS ONCE
Status: COMPLETED | OUTPATIENT
Start: 2018-06-01 | End: 2018-06-01

## 2018-06-01 RX ORDER — KETOROLAC TROMETHAMINE 15 MG/ML
15 INJECTION, SOLUTION INTRAMUSCULAR; INTRAVENOUS EVERY 6 HOURS
Status: DISCONTINUED | OUTPATIENT
Start: 2018-06-01 | End: 2018-06-03

## 2018-06-01 RX ORDER — GABAPENTIN 300 MG/1
300 CAPSULE ORAL 3 TIMES DAILY
Status: DISCONTINUED | OUTPATIENT
Start: 2018-06-01 | End: 2018-06-03

## 2018-06-01 NOTE — CM/SW NOTE
SW spoke with pt about discharge planning. Pt's insurance is workmen's comp claim #-[id number] Maliha Mcneil is Liseth Rivera [phone]. Plan is home with 24 hr supervision vs home with Meir Hernandez. SW explained options and provided choice.  P

## 2018-06-01 NOTE — PROGRESS NOTES
[address] [facility]    Wt Readings from Last 6 Encounters:  05/29/18 : 250 lb (113.4 kg)  06/01/18 : 250 lb (113.4 kg)  05/22/18 : 258 lb (6 kg)    56year old  Surgical/Procedural Cases on this Admission     Case IDs Date Procedure Surgeon Location Statu 9150   Gross per 24 hour   Intake              600 ml   Output              860 ml   Net             -260 ml           Patient has good strength in the lower extremities including hip flexors, quadriceps, hamstrings, ankle dorsi/plantar flexors and great t

## 2018-06-01 NOTE — PLAN OF CARE
Diabetes/Glucose Control    • Glucose maintained within prescribed range Progressing        Reviewed diabetes education packet and importance of managing his blood sugar. Accu checks achs. SS coverage provided as needed.      MUSCULOSKELETAL - ADULT    •

## 2018-06-01 NOTE — PROGRESS NOTES
[place] [facility] - [facility]    Progress Note    [address] [facility] Patient Status:  Inpatient    10/16/1970 MRN [id number]   Location [facility] 2W/SW Attending Katlin Ordoñez MD   Hosp Day # 3 PCP Abrahan Taylor     Subjective:   Subjective:  06/01/2018   CREATSERUM 0.95 06/01/2018   BUN 18 06/01/2018    (L) 06/01/2018   K 4.1 06/01/2018   CL 95 06/01/2018   CO2 30 06/01/2018    (H) 06/01/2018   CA 9.3 06/01/2018   ALB 4.8 05/16/2018   ALKPHO 71 05/16/2018   BILT 0.7 05/1

## 2018-06-01 NOTE — PHYSICAL THERAPY NOTE
PHYSICAL THERAPY TREATMENT NOTE - INPATIENT     Room Number: 344/914-U       Presenting Problem: p/w lumbar spine stenosis, s/p L5-S1 PSF    Problem List  Principal Problem:    Lumbar spinal stenosis  Active Problems:    Type 2 [facility] mellitus [facility]    E (including adjusting bedclothes, sheets and blankets)?: A Lot   -   Sitting down on and standing up from a chair with arms (e.g., wheelchair, bedside commode, etc.): Unable   -   Moving from lying on back to sitting on the side of the bed?: A Lot   How muc home activity/exercise instructions provided to patient in preparation for discharge.    Goal #5   Current Status  working towards   Goal #6     Goal #6  Current Status

## 2018-06-01 NOTE — PROGRESS NOTES
[facility]  Inpatient Pain Management Progress Note      Patient name: Manav Gonzáles 56year old male  : 10/16/1970  MRN: [id number]    Diagnosis: Lumbar spinal stenosis  (primary encounter diagnosis)  Lumbar degenerative disc disea Oral Daily     Continuous Infusions:  • lactated ringers 20 mL/hr at 05/29/18 0717   • dextrose       PRN Meds:.diazepam, acetaminophen **OR** [DISCONTINUED] HYDROcodone-acetaminophen **OR** [DISCONTINUED] HYDROcodone-acetaminophen, oxyCODONE-acetaminophen patients health care providers.      Chronic Pain Service [phone]

## 2018-06-01 NOTE — CM/SW NOTE
Referral to [facility]. They can’t verify WC until Monday- if he goes home this weekend, they can’t see him until at least Monday.      [address] ext [phone]

## 2018-06-01 NOTE — OCCUPATIONAL THERAPY NOTE
OCCUPATIONAL THERAPY TREATMENT NOTE - INPATIENT    Room Number: 450/697-J         Presenting Problem:  (post lumbar lami/spinal fusion )     Problem List  Principal Problem:    Lumbar spinal stenosis  Active Problems:    Type 2 diabetes mellitus ([facility] [address] [facility]    E (including washing, rinsing, drying)?: A Lot  -   Toileting, which includes using toilet, bedpan or urinal? : A Little  -   Putting on and taking off regular upper body clothing?: A Little  -   Taking care of personal grooming such as brushing teeth?: None

## 2018-06-01 NOTE — OCCUPATIONAL THERAPY NOTE
Pt not seen for OT at this time secondary to per nursing, pt is off of floor for test. Will attempt to see pt later in date as schedule permits.

## 2018-06-02 PROCEDURE — 99233 SBSQ HOSP IP/OBS HIGH 50: CPT | Performed by: HOSPITALIST

## 2018-06-02 RX ORDER — OXYCODONE HCL 10 MG/1
10 TABLET, FILM COATED, EXTENDED RELEASE ORAL EVERY 12 HOURS
Qty: 30 TABLET | Refills: 0 | Status: SHIPPED | OUTPATIENT
Start: 2018-06-02 | End: 2018-11-13 | Stop reason: ALTCHOICE

## 2018-06-02 RX ORDER — OXYCODONE AND ACETAMINOPHEN 10; 325 MG/1; MG/1
1 TABLET ORAL EVERY 4 HOURS PRN
Qty: 80 TABLET | Refills: 0 | Status: SHIPPED | OUTPATIENT
Start: 2018-06-02 | End: 2018-11-13 | Stop reason: ALTCHOICE

## 2018-06-02 NOTE — PROGRESS NOTES
[facility]    Progress Note    [address] [facility] Patient Status:  Inpatient    10/16/1970 MRN [id number]   Location [facility] 2W/SW Attending Ta Juarez MD   [facility] Day # 4 PCP Izabela Shannon     Subjective:   Subjective: work up.         Results:       Lab Results  Component Value Date   WBC 9.7 06/01/2018   HGB 14.4 06/01/2018   HCT 41.4 06/01/2018    06/01/2018   CREATSERUM 0.95 06/01/2018   BUN 18 06/01/2018    (L) 06/01/2018   K 4.1 06/01/2018   CL 95 06/01

## 2018-06-02 NOTE — PROGRESS NOTES
[address] [facility]    Wt Readings from Last 6 Encounters:  05/29/18 : 250 lb (113.4 kg)  06/01/18 : 250 lb (113.4 kg)  05/22/18 : 258 lb (6 kg)    56year old  Surgical/Procedural Cases on this Admission     Case IDs Date Procedure Surgeon Location Statu at 06/02/18 0726   Gross per 24 hour   Intake                0 ml   Output             1000 ml   Net            -1000 ml           Patient has good strength in the lower extremities including hip flexors, quadriceps, hamstrings, ankle dorsi/plantar flexors

## 2018-06-02 NOTE — OCCUPATIONAL THERAPY NOTE
OCCUPATIONAL THERAPY TREATMENT NOTE - INPATIENT    Room Number: 456/161-G         Presenting Problem:  (post lumbar lami/spinal fusion )     Problem List  Principal Problem:    Lumbar spinal stenosis  Active Problems:    Type 2 diabetes mellitus ([facility]    E OBJECTIVE  Precautions: Lumbar brace;Spine    WEIGHT BEARING RESTRICTION  Weight Bearing Restriction: None                PAIN ASSESSMENT  Ratin  Location:  (back)  Management Techniques:  Activity promotion     ACTIVITY TOLERANCE  O2 Saturation: 92 OT Goals:    Patient will complete LE dressing with AE prn and SBA  Comment: goal met, mod I for crossed leg    Patient will complete toilet transfer with SBA  Comment: NT    Patient will don/doff LSO brace with SBA   Comment:NT    Patient will independe

## 2018-06-02 NOTE — CHRONIC PAIN
[facility]  Anesthesiology Pain Management Progress Note      Patient name: Xander Barrett 56year old male  : 10/16/1970  MRN: [id number]    Diagnosis:  Lumbar spinal stenosis  (primary encounter diagnosis)  Lumbar degenerative disc 400 MG/5ML suspension 30 mL, 30 mL, Oral, Daily PRN  •  bisacodyl (DULCOLAX) rectal suppository 10 mg, 10 mg, Rectal, Daily PRN  •  FLEET ENEMA (FLEET) 7-19 GM/118ML enema 133 mL, 1 enema, Rectal, Once PRN  •  Metoclopramide HCl (REGLAN) injection 10 mg, 1 Aspart Pen  8 Units Subcutaneous TID CC   • Senna  17.2 mg Oral Nightly   • docusate sodium  100 mg Oral BID   • AmLODIPine Besylate  5 mg Oral Daily   • Pantoprazole Sodium  20 mg Oral QAM AC   • Insulin Aspart Pen  1-7 Units Subcutaneous TID CC   • ana mainstem bronchus. 4. Suspect hepatic steatosis.   Dictated by (CST): Bailey Mac MD on 5/31/2018 at 12:51     Approved by (CST): Bailey Mac MD on 5/31/2018 at 13:02              Assessment:  Pain contolled with   OxyContin 10 every 12 hours

## 2018-06-02 NOTE — PHYSICAL THERAPY NOTE
PHYSICAL THERAPY TREATMENT NOTE - INPATIENT     Room Number: 033/181-C       Presenting Problem: p/w lumbar spine stenosis, s/p L5-S1 PSF    Problem List  Principal Problem:    Lumbar spinal stenosis  Active Problems:    Type [facility]    E 2L.    AM-PAC '6-Clicks' INPATIENT SHORT FORM - BASIC MOBILITY  How much difficulty does the patient currently have. ..  -   Turning over in bed (including adjusting bedclothes, sheets and blankets)?: A Little   -   Sitting down on and standing up from a ch provided   Goal #5 Patient to demonstrate independence with home activity/exercise instructions provided to patient in preparation for discharge.    Goal #5   Current Status  in progress   Goal #6     Goal #6  Current Status

## 2018-06-03 ENCOUNTER — APPOINTMENT (OUTPATIENT)
Dept: CV DIAGNOSTICS | Facility: HOSPITAL | Age: 48
DRG: 460 | End: 2018-06-03
Attending: HOSPITALIST
Payer: OTHER MISCELLANEOUS

## 2018-06-03 VITALS
BODY MASS INDEX: 31.08 KG/M2 | OXYGEN SATURATION: 92 % | SYSTOLIC BLOOD PRESSURE: 112 MMHG | WEIGHT: 250 LBS | HEIGHT: 75 IN | DIASTOLIC BLOOD PRESSURE: 70 MMHG | RESPIRATION RATE: 18 BRPM | HEART RATE: 109 BPM | TEMPERATURE: 99 F

## 2018-06-03 PROCEDURE — 93306 TTE W/DOPPLER COMPLETE: CPT | Performed by: HOSPITALIST

## 2018-06-03 PROCEDURE — 99233 SBSQ HOSP IP/OBS HIGH 50: CPT | Performed by: HOSPITALIST

## 2018-06-03 NOTE — PHYSICAL THERAPY NOTE
PHYSICAL THERAPY TREATMENT NOTE - INPATIENT     Room Number: 984/368-H       Presenting Problem: p/w lumbar spine stenosis, s/p L5-S1 PSF    Problem List  Principal Problem:    Lumbar spinal stenosis  Active Problems:    Type 2 diabetes mellitus ([facility]    E 93%  Room air  No shortness of breath    AM-PAC '6-Clicks' INPATIENT SHORT FORM - BASIC MOBILITY  How much difficulty does the patient currently have. ..  -   Turning over in bed (including adjusting bedclothes, sheets and blankets)?: A Little   -   Sitting instructions provided to patient in preparation for discharge.    Goal #5   Current Status IN PROGRESS   Goal #6     Goal #6  Current Status

## 2018-06-03 NOTE — CHRONIC PAIN
[facility]  Anesthesiology Pain Management Progress Note      Patient name: Taras Conway 56year old male  : 10/16/1970  MRN: [id number]    Diagnosis:  Lumbar spinal stenosis  (primary encounter diagnosis)  Lumbar degenerative disc PRN  •  magnesium hydroxide (MILK OF MAGNESIA) 400 MG/5ML suspension 30 mL, 30 mL, Oral, Daily PRN  •  bisacodyl (DULCOLAX) rectal suppository 10 mg, 10 mg, Rectal, Daily PRN  •  FLEET ENEMA (FLEET) 7-19 GM/118ML enema 133 mL, 1 enema, Rectal, Once PRN  • Subcutaneous TID CC   • gabapentin  300 mg Oral TID   • OxyCODONE HCl ER  10 mg Oral Q12H   • ketorolac (TORADOL) injection  15 mg Intravenous Q6H   • Senna  17.2 mg Oral Nightly   • docusate sodium  100 mg Oral BID   • AmLODIPine Besylate  5 mg Oral Daily PRN    Plan:  home    Fab Parker  6/3/2018  Anesthesia Chronic Pain Service [phone]

## 2018-06-03 NOTE — OCCUPATIONAL THERAPY NOTE
Attempt made to work with patient this afternoon; currently off the floor for echo; will follow up if schedule permits.      Pepe Adkins, OTR/L   [facility]

## 2018-06-03 NOTE — OCCUPATIONAL THERAPY NOTE
OCCUPATIONAL THERAPY TREATMENT NOTE - INPATIENT    Room Number: 075/793-Q         Presenting Problem:  (post lumbar lami/spinal fusion )     Problem List  Principal Problem:    Lumbar spinal stenosis  Active Problems:    Type 2 diabetes mellitus ([facility] [facility]    E Score (AM-PAC Scale): 42.03  CMS Modifier (G-Code): CJ    FUNCTIONAL TRANSFER ASSESSMENT  Supine to Sit : Minimum assistance (w/ HOB elevated)  Sit to Stand: Minimum assistance  Toilet Transfer: SBA  Shower Transfer: NT  Chair Transfer: SBA  Car Transfer:

## 2018-06-03 NOTE — PROGRESS NOTES
[facility]    Progress Note    [address] [facility] Patient Status:  Inpatient    10/16/1970 MRN [id number]   Location [facility] 2W/SW Attending Yong Vega MD   Hosp Day # 5 PCP Norma Diamond     Subjective:   Subjective: than 35 minutes spent, >50% spent counseling re: treatment plan and work up.         Results:       Lab Results  Component Value Date   WBC 9.7 06/01/2018   HGB 14.4 06/01/2018   HCT 41.4 06/01/2018    06/01/2018   CREATSERUM 0.95 06/01/2018   BUN 18

## 2018-06-04 NOTE — CM/SW NOTE
[facility] order has been sent to [facility]. Laith Dennis is working on 's.     [address] ext [phone]

## 2018-06-05 ENCOUNTER — TELEPHONE (OUTPATIENT)
Dept: CARDIOLOGY UNIT | Facility: HOSPITAL | Age: 48
End: 2018-06-05

## 2018-06-05 NOTE — DISCHARGE SUMMARY
[place] [facility] - [facility]    Discharge Summary    [address] [facility] Patient Status:  Inpatient    10/16/1970 MRN [id number]   Location [facility] 4W/SW/SE Attending No att. providers found   Hosp Day # 5 PCP Serafin Gaona     Date of Admiss and SOB/Hypoxia  - patient desats to the 80s when we remove oxygen   - check ct chest to r/o pe - negative for pe - showed atelectasis - counseled on IS use  - despite better pain control patient still quite tachycardic - started metoprolol and improved, e Cpdr  Commonly known as:  PRILOSEC      Take 20 mg by mouth every morning before breakfast.   Refills:  0     simvastatin 10 MG Tabs  Commonly known as:  ZOCOR      Take 10 mg by mouth nightly.    Refills:  0     TRULICITY 1.5 BI/9.7VR Sopn  Generic drug:

## 2018-07-26 ENCOUNTER — CHARTING TRANS (OUTPATIENT)
Dept: OTHER | Age: 48
End: 2018-07-26

## 2018-07-26 ENCOUNTER — HOSPITAL (OUTPATIENT)
Dept: OTHER | Age: 48
End: 2018-07-26
Attending: FAMILY MEDICINE

## 2018-07-26 ENCOUNTER — IMAGING SERVICES (OUTPATIENT)
Dept: OTHER | Age: 48
End: 2018-07-26

## 2018-07-26 ENCOUNTER — DIAGNOSTIC TRANS (OUTPATIENT)
Dept: OTHER | Age: 48
End: 2018-07-26

## 2018-07-26 LAB
ALBUMIN SERPL-MCNC: 4 GM/DL (ref 3.6–5.1)
ALBUMIN/GLOB SERPL: 1 {RATIO} (ref 1–2.4)
ALP SERPL-CCNC: 107 UNIT/L (ref 45–117)
ALT SERPL-CCNC: 26 UNIT/L
ANALYZER ANC (IANC): NORMAL
ANION GAP SERPL CALC-SCNC: 13 MMOL/L (ref 10–20)
AST SERPL-CCNC: 11 UNIT/L
BILIRUB SERPL-MCNC: 0.7 MG/DL (ref 0.2–1)
BUN SERPL-MCNC: 22 MG/DL (ref 6–20)
BUN/CREAT SERPL: 28 (ref 7–25)
CALCIUM SERPL-MCNC: 9.6 MG/DL (ref 8.4–10.2)
CHLORIDE: 99 MMOL/L (ref 98–107)
CHOLEST SERPL-MCNC: 208 MG/DL
CHOLEST/HDLC SERPL: 7.7 {RATIO}
CO2 SERPL-SCNC: 27 MMOL/L (ref 21–32)
CREAT SERPL-MCNC: 0.77 MG/DL (ref 0.67–1.17)
D DIMER PPP FEU-MCNC: 3.02 MG/L FEU
ERYTHROCYTE [DISTWIDTH] IN BLOOD: 13.3 % (ref 11–15)
GLOBULIN SER-MCNC: 4 GM/DL (ref 2–4)
GLUCOSE BLDC GLUCOMTR-MCNC: 274 MG/DL (ref 65–99)
GLUCOSE BLDC GLUCOMTR-MCNC: 292 MG/DL (ref 65–99)
GLUCOSE SERPL-MCNC: 236 MG/DL (ref 65–99)
GLYCOHEMOGLOBIN: 9 % (ref 4.5–5.6)
HDLC SERPL-MCNC: 27 MG/DL
HEMATOCRIT: 44.4 % (ref 39–51)
HGB BLD-MCNC: 15.1 GM/DL (ref 13–17)
LDLC SERPL CALC-MCNC: 145 MG/DL
MCH RBC QN AUTO: 29.7 PG (ref 26–34)
MCHC RBC AUTO-ENTMCNC: 34 GM/DL (ref 32–36.5)
MCV RBC AUTO: 87.4 FL (ref 78–100)
NONHDLC SERPL-MCNC: 181 MG/DL
NRBC (NRBCRE): 0 /100 WBC
PLATELET # BLD: 232 THOUSAND/MCL (ref 140–450)
POTASSIUM SERPL-SCNC: 3.8 MMOL/L (ref 3.4–5.1)
PROT SERPL-MCNC: 8 GM/DL (ref 6.4–8.2)
RBC # BLD: 5.08 MILLION/MCL (ref 4.5–5.9)
SODIUM SERPL-SCNC: 135 MMOL/L (ref 135–145)
TRIGLYCERIDE (TRIGP): 178 MG/DL
TROPONIN I SERPL HS-MCNC: <0.02 NG/ML
TSH SERPL-ACNC: 0.61 MCUNIT/ML (ref 0.35–5)
WBC # BLD: 10.6 THOUSAND/MCL (ref 4.2–11)

## 2018-07-27 ENCOUNTER — IMAGING SERVICES (OUTPATIENT)
Dept: OTHER | Age: 48
End: 2018-07-27

## 2018-07-27 LAB
ALBUMIN SERPL-MCNC: 3.6 GM/DL (ref 3.6–5.1)
ALBUMIN/GLOB SERPL: 0.9 {RATIO} (ref 1–2.4)
ALP SERPL-CCNC: 101 UNIT/L (ref 45–117)
ALT SERPL-CCNC: 27 UNIT/L
ANALYZER ANC (IANC): ABNORMAL
ANION GAP SERPL CALC-SCNC: 11 MMOL/L (ref 10–20)
AST SERPL-CCNC: 13 UNIT/L
BASOPHILS # BLD: 0.1 THOUSAND/MCL (ref 0–0.3)
BASOPHILS NFR BLD: 1 %
BILIRUB SERPL-MCNC: 0.8 MG/DL (ref 0.2–1)
BUN SERPL-MCNC: 22 MG/DL (ref 6–20)
BUN/CREAT SERPL: 25 (ref 7–25)
CALCIUM SERPL-MCNC: 9.3 MG/DL (ref 8.4–10.2)
CHLORIDE: 98 MMOL/L (ref 98–107)
CO2 SERPL-SCNC: 27 MMOL/L (ref 21–32)
CREAT SERPL-MCNC: 0.9 MG/DL (ref 0.67–1.17)
DIFFERENTIAL METHOD BLD: ABNORMAL
EOSINOPHIL # BLD: 0.4 THOUSAND/MCL (ref 0.1–0.5)
EOSINOPHIL NFR BLD: 4 %
ERYTHROCYTE [DISTWIDTH] IN BLOOD: 13.4 % (ref 11–15)
GLOBULIN SER-MCNC: 4 GM/DL (ref 2–4)
GLUCOSE BLDC GLUCOMTR-MCNC: 184 MG/DL (ref 65–99)
GLUCOSE BLDC GLUCOMTR-MCNC: 238 MG/DL (ref 65–99)
GLUCOSE BLDC GLUCOMTR-MCNC: 260 MG/DL (ref 65–99)
GLUCOSE BLDC GLUCOMTR-MCNC: 287 MG/DL (ref 65–99)
GLUCOSE BLDC GLUCOMTR-MCNC: 322 MG/DL (ref 65–99)
GLUCOSE SERPL-MCNC: 231 MG/DL (ref 65–99)
HEMATOCRIT: 43.1 % (ref 39–51)
HGB BLD-MCNC: 14.7 GM/DL (ref 13–17)
IMM GRANULOCYTES # BLD AUTO: 0.1 THOUSAND/MCL (ref 0–0.2)
IMM GRANULOCYTES NFR BLD: 0 %
LYMPHOCYTES # BLD: 2.8 THOUSAND/MCL (ref 1–4.8)
LYMPHOCYTES NFR BLD: 24 %
MCH RBC QN AUTO: 30.1 PG (ref 26–34)
MCHC RBC AUTO-ENTMCNC: 34.1 GM/DL (ref 32–36.5)
MCV RBC AUTO: 88.3 FL (ref 78–100)
MONOCYTES # BLD: 1 THOUSAND/MCL (ref 0.3–0.9)
MONOCYTES NFR BLD: 9 %
NEUTROPHILS # BLD: 7 THOUSAND/MCL (ref 1.8–7.7)
NEUTROPHILS NFR BLD: 62 %
NEUTS SEG NFR BLD: ABNORMAL %
NRBC (NRBCRE): 0 /100 WBC
PLATELET # BLD: 232 THOUSAND/MCL (ref 140–450)
POTASSIUM SERPL-SCNC: 3.9 MMOL/L (ref 3.4–5.1)
PROT SERPL-MCNC: 7.6 GM/DL (ref 6.4–8.2)
RBC # BLD: 4.88 MILLION/MCL (ref 4.5–5.9)
SODIUM SERPL-SCNC: 132 MMOL/L (ref 135–145)
WBC # BLD: 11.3 THOUSAND/MCL (ref 4.2–11)

## 2018-07-28 LAB
GLUCOSE BLDC GLUCOMTR-MCNC: 182 MG/DL (ref 65–99)
GLUCOSE BLDC GLUCOMTR-MCNC: 215 MG/DL (ref 65–99)

## 2018-08-03 ENCOUNTER — CHARTING TRANS (OUTPATIENT)
Dept: OTHER | Age: 48
End: 2018-08-03

## 2018-08-23 ENCOUNTER — CHARTING TRANS (OUTPATIENT)
Dept: OTHER | Age: 48
End: 2018-08-23

## 2018-09-12 ENCOUNTER — CHARTING TRANS (OUTPATIENT)
Dept: OTHER | Age: 48
End: 2018-09-12

## 2018-09-12 ENCOUNTER — LAB SERVICES (OUTPATIENT)
Dept: OTHER | Age: 48
End: 2018-09-12

## 2018-09-12 LAB — HBA1C MFR BLD: 9.6

## 2018-10-31 VITALS
BODY MASS INDEX: 30.96 KG/M2 | DIASTOLIC BLOOD PRESSURE: 97 MMHG | HEIGHT: 75 IN | WEIGHT: 249.01 LBS | SYSTOLIC BLOOD PRESSURE: 145 MMHG | HEART RATE: 112 BPM

## 2018-10-31 VITALS
HEART RATE: 120 BPM | BODY MASS INDEX: 30.32 KG/M2 | RESPIRATION RATE: 24 BRPM | WEIGHT: 243.83 LBS | DIASTOLIC BLOOD PRESSURE: 99 MMHG | OXYGEN SATURATION: 98 % | TEMPERATURE: 97.8 F | HEIGHT: 75 IN | SYSTOLIC BLOOD PRESSURE: 140 MMHG

## 2018-11-27 VITALS
DIASTOLIC BLOOD PRESSURE: 101 MMHG | BODY MASS INDEX: 31.22 KG/M2 | HEART RATE: 91 BPM | WEIGHT: 251.1 LBS | HEIGHT: 75 IN | SYSTOLIC BLOOD PRESSURE: 146 MMHG

## 2018-11-27 VITALS
OXYGEN SATURATION: 97 % | RESPIRATION RATE: 20 BRPM | DIASTOLIC BLOOD PRESSURE: 92 MMHG | BODY MASS INDEX: 30.2 KG/M2 | SYSTOLIC BLOOD PRESSURE: 150 MMHG | WEIGHT: 255.74 LBS | TEMPERATURE: 97.8 F | HEIGHT: 77 IN | HEART RATE: 92 BPM

## 2018-12-04 NOTE — OPERATIVE REPORT
[facility]    PATIENT'S NAME: Shabbir Soliz   ATTENDING PHYSICIAN: Koki Morales MD   OPERATING PHYSICIAN: Iver Lav. Holland Kayser, MD   PATIENT ACCOUNT#:   [id number]    LOCATION:  [address] [place] [address] #:   [id number]       DATE OF BI transfusion, infection, possibly requiring additional surgery, the risk of cerebrospinal fluid leakage or failed fusion. The patient stated that he understood wished to proceed.     OPERATIVE TECHNIQUE:  He was taken to the operating room where general ane were performed over the L5 and S1 nerve roots bilaterally, and a Tioga dissector was passed out each foramen as well as along the lateral recess between the 2 to confirm that there was no further nerve root compression.   This was made more difficult due was 300 mL. Dictated By Sue Chaudhry MD  d: 12/03/2018 11:06:39  t: 12/03/2018 11:36:23  [facility] [id number]  [facility]/

## 2018-12-10 RX ORDER — PREGABALIN 75 MG/1
CAPSULE ORAL
COMMUNITY
Start: 2018-08-23 | End: 2018-12-10 | Stop reason: SDUPTHER

## 2018-12-10 RX ORDER — IRBESARTAN AND HYDROCHLOROTHIAZIDE 300; 12.5 MG/1; MG/1
TABLET, FILM COATED ORAL
COMMUNITY
Start: 2018-08-03 | End: 2019-08-03

## 2018-12-10 RX ORDER — SIMVASTATIN 10 MG
TABLET ORAL
COMMUNITY
Start: 2018-09-19 | End: 2019-09-19

## 2018-12-10 RX ORDER — AMLODIPINE BESYLATE 10 MG/1
TABLET ORAL
COMMUNITY
Start: 2018-08-23 | End: 2019-08-23

## 2018-12-10 RX ORDER — CYCLOBENZAPRINE HCL 10 MG
TABLET ORAL
COMMUNITY
End: 2018-12-12

## 2018-12-10 RX ORDER — METOPROLOL TARTRATE 50 MG/1
TABLET, FILM COATED ORAL
COMMUNITY

## 2018-12-10 RX ORDER — METFORMIN HYDROCHLORIDE 500 MG/1
TABLET, EXTENDED RELEASE ORAL
COMMUNITY
Start: 2018-09-04 | End: 2019-05-21 | Stop reason: SDUPTHER

## 2018-12-12 ENCOUNTER — OFFICE VISIT (OUTPATIENT)
Dept: FAMILY MEDICINE | Age: 48
End: 2018-12-12

## 2018-12-12 DIAGNOSIS — Z00.00 HEALTH CARE MAINTENANCE: Primary | ICD-10-CM

## 2018-12-12 DIAGNOSIS — E11.65 TYPE 2 DIABETES MELLITUS WITH HYPERGLYCEMIA, WITH LONG-TERM CURRENT USE OF INSULIN (CMD): ICD-10-CM

## 2018-12-12 DIAGNOSIS — Z86.711 HISTORY OF PULMONARY EMBOLISM: ICD-10-CM

## 2018-12-12 DIAGNOSIS — I10 ESSENTIAL HYPERTENSION: ICD-10-CM

## 2018-12-12 DIAGNOSIS — Z79.4 TYPE 2 DIABETES MELLITUS WITH HYPERGLYCEMIA, WITH LONG-TERM CURRENT USE OF INSULIN (CMD): ICD-10-CM

## 2018-12-12 LAB — HBA1C MFR BLD: 9.4 % (ref 4.5–5.6)

## 2018-12-12 PROCEDURE — 3079F DIAST BP 80-89 MM HG: CPT | Performed by: FAMILY MEDICINE

## 2018-12-12 PROCEDURE — 99214 OFFICE O/P EST MOD 30 MIN: CPT | Performed by: FAMILY MEDICINE

## 2018-12-12 PROCEDURE — 3075F SYST BP GE 130 - 139MM HG: CPT | Performed by: FAMILY MEDICINE

## 2018-12-12 PROCEDURE — 83036 HEMOGLOBIN GLYCOSYLATED A1C: CPT | Performed by: FAMILY MEDICINE

## 2018-12-12 RX ORDER — AMLODIPINE BESYLATE 5 MG/1
TABLET ORAL
COMMUNITY
Start: 2018-05-14 | End: 2018-12-12

## 2018-12-12 RX ORDER — VALSARTAN AND HYDROCHLOROTHIAZIDE 320; 25 MG/1; MG/1
TABLET, FILM COATED ORAL
COMMUNITY
Start: 2018-08-07 | End: 2018-12-12

## 2018-12-12 RX ORDER — TRAMADOL HYDROCHLORIDE 150 MG/1
CAPSULE, EXTENDED RELEASE ORAL
Refills: 0 | COMMUNITY
Start: 2018-09-19 | End: 2018-12-12

## 2018-12-12 SDOH — HEALTH STABILITY: MENTAL HEALTH: HOW OFTEN DO YOU HAVE A DRINK CONTAINING ALCOHOL?: NEVER

## 2018-12-12 ASSESSMENT — PATIENT HEALTH QUESTIONNAIRE - PHQ9
2. FEELING DOWN, DEPRESSED OR HOPELESS: NOT AT ALL
SUM OF ALL RESPONSES TO PHQ9 QUESTIONS 1 AND 2: 0
1. LITTLE INTEREST OR PLEASURE IN DOING THINGS: NOT AT ALL

## 2018-12-12 ASSESSMENT — ENCOUNTER SYMPTOMS
CONSTIPATION: 0
DIARRHEA: 0
SHORTNESS OF BREATH: 0

## 2018-12-12 ASSESSMENT — PAIN SCALES - GENERAL: PAINLEVEL: 1-2

## 2019-01-22 ENCOUNTER — APPOINTMENT (OUTPATIENT)
Dept: FAMILY MEDICINE | Age: 49
End: 2019-01-22

## 2019-01-28 RX ORDER — AMLODIPINE BESYLATE 5 MG/1
TABLET ORAL
Qty: 90 TABLET | Refills: 0 | Status: SHIPPED | OUTPATIENT
Start: 2019-01-28

## 2019-05-22 RX ORDER — METFORMIN HYDROCHLORIDE 500 MG/1
TABLET, EXTENDED RELEASE ORAL
Qty: 360 TABLET | Refills: 1 | Status: SHIPPED | OUTPATIENT
Start: 2019-05-22

## 2019-08-23 RX ORDER — INSULIN GLARGINE 100 [IU]/ML
INJECTION, SOLUTION SUBCUTANEOUS
Qty: 30 ML | Refills: 5 | Status: SHIPPED | OUTPATIENT
Start: 2019-08-23

## 2019-10-29 RX ORDER — AMLODIPINE BESYLATE 10 MG/1
TABLET ORAL
Qty: 90 TABLET | Refills: 0 | Status: SHIPPED | OUTPATIENT
Start: 2019-10-29

## 2019-11-18 RX ORDER — INSULIN ASPART 100 [IU]/ML
INJECTION, SOLUTION INTRAVENOUS; SUBCUTANEOUS
Qty: 15 PACKAGE | Refills: 3 | Status: SHIPPED | OUTPATIENT
Start: 2019-11-18

## 2021-05-26 VITALS
HEART RATE: 77 BPM | HEIGHT: 75 IN | BODY MASS INDEX: 32.24 KG/M2 | RESPIRATION RATE: 17 BRPM | DIASTOLIC BLOOD PRESSURE: 86 MMHG | WEIGHT: 259.26 LBS | TEMPERATURE: 97.8 F | SYSTOLIC BLOOD PRESSURE: 139 MMHG | OXYGEN SATURATION: 98 %

## 2021-07-13 NOTE — LETTER
[address] [place] [address] Rd, [place], [place]       AUTHORIZATION FOR SURGICAL OPERATION OR PROCEDURE    1.  I hereby authorize Dr. Mary Ellen Palma MD , DR Joseph Herrera MD, my Physician(s) and whomever may be designated as the doctor's (CMV),and fluid overload. In the event that I wish         to have an autologous transfusion of my own blood, or a directed donor transfusion, I will discuss this with my         Physician.   5.   I consent to the photographing of procedure(s) to be perform __________________________________________  (Responsible person in case of minor or unconscious patient)   (Relationship to Patient)      _______________________________________________________________ ____________________________  (Witness signature) Same Histology In Subsequent Stages Text: The pattern and morphology of the tumor is as described in the first stage.

## 2022-03-21 ENCOUNTER — TELEPHONE (OUTPATIENT)
Dept: ENDOCRINOLOGY | Facility: CLINIC | Age: 52
End: 2022-03-21

## 2022-03-21 NOTE — TELEPHONE ENCOUNTER
Attempted to contact Mr. Barrera X 4 to schedule an appt. as a New Patient with HE Arevalo PA-C for DM Type II with a foot ulcer but there was no answer & his VM was not set up. This was a referral from [facility].

## 2022-04-05 ENCOUNTER — OFFICE VISIT (OUTPATIENT)
Dept: URGENT CARE | Facility: CLINIC | Age: 52
End: 2022-04-05
Payer: COMMERCIAL

## 2022-04-05 VITALS
SYSTOLIC BLOOD PRESSURE: 176 MMHG | DIASTOLIC BLOOD PRESSURE: 96 MMHG | TEMPERATURE: 98 F | HEIGHT: 75 IN | OXYGEN SATURATION: 98 % | RESPIRATION RATE: 19 BRPM | HEART RATE: 106 BPM | BODY MASS INDEX: 28.6 KG/M2 | WEIGHT: 230 LBS

## 2022-04-05 DIAGNOSIS — R03.0 ELEVATED BLOOD PRESSURE READING: ICD-10-CM

## 2022-04-05 DIAGNOSIS — L97.509 TYPE 1 DIABETES MELLITUS WITH FOOT ULCER: ICD-10-CM

## 2022-04-05 DIAGNOSIS — E10.621 TYPE 1 DIABETES MELLITUS WITH FOOT ULCER: ICD-10-CM

## 2022-04-05 DIAGNOSIS — L03.115 CELLULITIS OF FOOT, RIGHT: ICD-10-CM

## 2022-04-05 DIAGNOSIS — L03.031 CELLULITIS OF GREAT TOE OF RIGHT FOOT: Primary | ICD-10-CM

## 2022-04-05 DIAGNOSIS — R00.0 TACHYCARDIA: ICD-10-CM

## 2022-04-05 PROCEDURE — 73630 XR FOOT COMPLETE 3 VIEW RIGHT: ICD-10-PCS | Mod: RT,S$GLB,, | Performed by: RADIOLOGY

## 2022-04-05 PROCEDURE — 99213 OFFICE O/P EST LOW 20 MIN: CPT | Mod: S$GLB,,, | Performed by: PHYSICIAN ASSISTANT

## 2022-04-05 PROCEDURE — 99213 PR OFFICE/OUTPT VISIT, EST, LEVL III, 20-29 MIN: ICD-10-PCS | Mod: S$GLB,,, | Performed by: PHYSICIAN ASSISTANT

## 2022-04-05 PROCEDURE — 73630 X-RAY EXAM OF FOOT: CPT | Mod: RT,S$GLB,, | Performed by: RADIOLOGY

## 2022-04-05 RX ORDER — GABAPENTIN 300 MG/1
300 CAPSULE ORAL 3 TIMES DAILY
COMMUNITY
Start: 2022-03-21

## 2022-04-05 RX ORDER — AMLODIPINE BESYLATE 5 MG/1
5 TABLET ORAL EVERY MORNING
COMMUNITY
Start: 2022-03-21

## 2022-04-05 RX ORDER — DOXYCYCLINE HYCLATE 100 MG
100 TABLET ORAL 2 TIMES DAILY
COMMUNITY
Start: 2022-03-21 | End: 2022-04-05

## 2022-04-05 NOTE — LETTER
April 5, 2022      [facility]  [address] [place] [address], [address]  [place] [address]  Phone: [phone]  Fax: [phone]       Patient: Shauna Barrera   YOB: 1970  Date of Visit: 04/05/2022    To Whom It May Concern:    Cherise Barrera  was at [facility] on 04/05/2022. The patient may return to work on 4/13/2022, if symptoms resolving. If you have any questions or concerns, or if I can be of further assistance, please do not hesitate to contact me.    Sincerely,    Chris Martell PA-C

## 2022-04-05 NOTE — PROGRESS NOTES
"Subjective:       Patient ID: Shauna Barrera is a 51 y.o. male.    Vitals:  height is 6' 3" (1.905 m) and weight is 104.3 kg (230 lb). His temperature is 97.7 °F (36.5 °C). His blood pressure is 176/96 (abnormal) and his pulse is 106. His respiration is 19 and oxygen saturation is 98%.     Chief Complaint: Toe Pain (Right big toe)    Pt presents to urgent care with right big two pain/infection not due blister from shoe that started one month ago, pt has been treated with anitibiotics but no relief. Denies fever.    Toe Pain   Incident onset: one month  The incident occurred at home. There was no injury mechanism. The pain is present in the right toes (big toe). The quality of the pain is described as aching. The pain is at a severity of 7/10. The pain is moderate. The pain has been intermittent since onset. Associated symptoms include an inability to bear weight. He reports no foreign bodies present. The symptoms are aggravated by weight bearing. Treatments tried: antibiotics  The treatment provided no relief.       Constitution: Negative for chills, sweating, fatigue and fever.   Musculoskeletal: Positive for pain.   Skin: Positive for color change, wound and erythema.       Objective:      Physical Exam   Constitutional: He is oriented to person, place, and time. He appears well-developed.  Non-toxic appearance. He does not appear ill. No distress.   HENT:   Head: Normocephalic and atraumatic.   Ears:   Right Ear: Hearing and external ear normal.   Left Ear: Hearing and external ear normal.   Eyes: Conjunctivae and EOM are normal. Pupils are equal, round, and reactive to light.   Neck: Neck supple. No neck rigidity present.   Cardiovascular: Regular rhythm. Tachycardia present.   Pulmonary/Chest: Effort normal and breath sounds normal. No respiratory distress.   Musculoskeletal:      Right foot: Normal capillary refill. Tenderness and swelling present.        Feet:    Neurological: He is alert and oriented to " person, place, and time.   Skin: Skin is warm, dry and not diaphoretic. erythema   Psychiatric: His speech is normal and behavior is normal. Mood normal.   Nursing note and vitals reviewed.                  X-Ray Foot Complete Right    Result Date: 4/5/2022  EXAMINATION: XR FOOT COMPLETE 3 VIEW RIGHT CLINICAL HISTORY: eval for potential osteomyelitis;. Cellulitis of right lower limb TECHNIQUE: AP, lateral, and oblique views of the right foot were performed. COMPARISON: None FINDINGS: A fracture of the bones of the right foot is not seen.  Soft tissue swelling or foreign bodies are not noted.  Juxta-articular bone erosion or Osteoporosis is not seen.  Cortical erosion or periosteal reaction is not seen     Negative x-rays of the right foot. Electronically signed by: Theodore Maki MD Date:    04/05/2022 Time:    11:18          Assessment:       1. Cellulitis of great toe of right foot    2. Type 1 diabetes mellitus with foot ulcer    3. Cellulitis of foot, right    4. Tachycardia    5. Elevated blood pressure reading          Plan:       Pt will travel to ER by private vehicle, with escort, for workup. Case discussed with Aris Cespedes MD, who agreed with decision, as pt needs more comprehensive labs at minimum. Will send family practice referral as pt has not established pcp in the area.    Cellulitis of great toe of right foot  -     X-Ray Foot Complete Right; Future; Expected date: 04/05/2022  -     Refer to Emergency Dept.    Type 1 diabetes mellitus with foot ulcer  -     X-Ray Foot Complete Right; Future; Expected date: 04/05/2022  -     Refer to Emergency Dept.  -     Ambulatory referral/consult to Family Practice    Cellulitis of foot, right  -     X-Ray Foot Complete Right; Future; Expected date: 04/05/2022  -     Refer to Emergency Dept.    Tachycardia  -     Refer to Emergency Dept.    Elevated blood pressure reading      Patient Instructions   Go directly to an Emergency Room for  evaluation.

## 2022-04-08 ENCOUNTER — TELEPHONE (OUTPATIENT)
Dept: URGENT CARE | Facility: CLINIC | Age: 52
End: 2022-04-08
Payer: COMMERCIAL

## 2022-11-22 NOTE — PROGRESS NOTES
[address] [facility]    Wt Readings from Last 6 Encounters:  05/29/18 : 250 lb (113.4 kg)  05/22/18 : 258 lb (6 kg)    56year old  Surgical/Procedural Cases on this Admission     Case IDs Date Procedure Surgeon Location Status    [id number] 5/29/18 POSTERIOR hour   Intake              400 ml   Output             1540 ml   Net            -1140 ml         Patient has good strength in the lower extremities including hip flexors, quadriceps, hamstrings, ankle dorsi/plantar flexors and great toe extensors.  Pt has s room.     Dictated by (CST): Jl Barrientos MD on 5/29/2018 at 11:02     Approved by (CST): Jl Barrientos MD on 5/29/2018 at 11:02          Ct Chest Pain/pe (iv Only) Em    Result Date: 5/31/2018  CONCLUSION:  1. No pulmonary embolism identified.   2 Add Associated Diagnosis When Managing Medication Side Effects: Yes

## 2023-07-28 ENCOUNTER — OFFICE VISIT (OUTPATIENT)
Dept: CARDIOLOGY | Facility: CLINIC | Age: 53
End: 2023-07-28
Payer: COMMERCIAL

## 2023-07-28 ENCOUNTER — HOSPITAL ENCOUNTER (OUTPATIENT)
Dept: RADIOLOGY | Facility: HOSPITAL | Age: 53
Discharge: HOME | End: 2023-07-28
Attending: INTERNAL MEDICINE
Payer: COMMERCIAL

## 2023-07-28 VITALS
WEIGHT: 218.25 LBS | SYSTOLIC BLOOD PRESSURE: 143 MMHG | HEIGHT: 75 IN | HEART RATE: 115 BPM | DIASTOLIC BLOOD PRESSURE: 88 MMHG | BODY MASS INDEX: 27.14 KG/M2

## 2023-07-28 DIAGNOSIS — R06.02 SOB (SHORTNESS OF BREATH): ICD-10-CM

## 2023-07-28 DIAGNOSIS — R00.0 TACHYCARDIA: Chronic | ICD-10-CM

## 2023-07-28 DIAGNOSIS — I15.2 HYPERTENSION ASSOCIATED WITH DIABETES: Chronic | ICD-10-CM

## 2023-07-28 DIAGNOSIS — R06.02 SOB (SHORTNESS OF BREATH): Primary | ICD-10-CM

## 2023-07-28 DIAGNOSIS — R55 NEAR SYNCOPE: Chronic | ICD-10-CM

## 2023-07-28 DIAGNOSIS — E11.59 HYPERTENSION ASSOCIATED WITH DIABETES: Chronic | ICD-10-CM

## 2023-07-28 DIAGNOSIS — Z72.0 TOBACCO USE: Chronic | ICD-10-CM

## 2023-07-28 DIAGNOSIS — I73.9 PVD (PERIPHERAL VASCULAR DISEASE) WITH CLAUDICATION: ICD-10-CM

## 2023-07-28 DIAGNOSIS — R53.83 OTHER FATIGUE: Chronic | ICD-10-CM

## 2023-07-28 PROCEDURE — 71046 XR CHEST PA AND LATERAL: ICD-10-PCS | Mod: 26,,, | Performed by: RADIOLOGY

## 2023-07-28 PROCEDURE — 99204 OFFICE O/P NEW MOD 45 MIN: CPT | Mod: S$GLB,,, | Performed by: INTERNAL MEDICINE

## 2023-07-28 PROCEDURE — 93005 ELECTROCARDIOGRAM TRACING: CPT | Mod: PO

## 2023-07-28 PROCEDURE — 71046 X-RAY EXAM CHEST 2 VIEWS: CPT | Mod: 26,,, | Performed by: RADIOLOGY

## 2023-07-28 PROCEDURE — 93010 ELECTROCARDIOGRAM REPORT: CPT | Mod: S$GLB,,, | Performed by: INTERNAL MEDICINE

## 2023-07-28 PROCEDURE — 93010 EKG 12-LEAD: ICD-10-PCS | Mod: S$GLB,,, | Performed by: INTERNAL MEDICINE

## 2023-07-28 PROCEDURE — 99204 PR OFFICE/OUTPT VISIT, NEW, LEVL IV, 45-59 MIN: ICD-10-PCS | Mod: S$GLB,,, | Performed by: INTERNAL MEDICINE

## 2023-07-28 PROCEDURE — 71046 X-RAY EXAM CHEST 2 VIEWS: CPT | Mod: TC,FY,PO

## 2023-07-28 PROCEDURE — 99999 PR PBB SHADOW E&M-EST. PATIENT-LVL III: CPT | Mod: PBBFAC,,, | Performed by: INTERNAL MEDICINE

## 2023-07-28 PROCEDURE — 99999 PR PBB SHADOW E&M-EST. PATIENT-LVL III: ICD-10-PCS | Mod: PBBFAC,,, | Performed by: INTERNAL MEDICINE

## 2023-07-28 RX ORDER — ROSUVASTATIN CALCIUM 20 MG/1
20 TABLET, COATED ORAL NIGHTLY
COMMUNITY
Start: 2023-06-05

## 2023-07-28 RX ORDER — METOPROLOL SUCCINATE 25 MG/1
12.5 TABLET, EXTENDED RELEASE ORAL DAILY
Qty: 15 TABLET | Refills: 1 | Status: SHIPPED | OUTPATIENT
Start: 2023-07-28 | End: 2023-08-29 | Stop reason: DRUGHIGH

## 2023-07-28 RX ORDER — DULAGLUTIDE 3 MG/.5ML
INJECTION, SOLUTION SUBCUTANEOUS
COMMUNITY
Start: 2023-07-05

## 2023-07-28 RX ORDER — LOSARTAN POTASSIUM AND HYDROCHLOROTHIAZIDE 25; 100 MG/1; MG/1
1 TABLET ORAL EVERY MORNING
COMMUNITY
Start: 2023-06-12

## 2023-07-28 RX ORDER — INSULIN GLARGINE 100 [IU]/ML
INJECTION, SOLUTION SUBCUTANEOUS
COMMUNITY
Start: 2023-06-08

## 2023-07-28 NOTE — PROGRESS NOTES
Subjective:    Patient ID:  Shanua Barrera is a 52 y.o. male who presents for Tachycardia, Peripheral Arterial Disease, Hypertension, Hyperlipidemia, and Heart Problem        Hypertension  Associated symptoms include blurred vision, malaise/fatigue and shortness of breath. Pertinent negatives include no chest pain, orthopnea, palpitations (SOME) or PND.   Hyperlipidemia  Associated symptoms include shortness of breath. Pertinent negatives include no chest pain or focal weakness.   Heart Problem  Pertinent negatives include no abdominal pain, chest pain, chills, congestion, coughing, diaphoresis, fever or nausea.   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    Past Medical History:   Diagnosis Date    Diabetes mellitus type I     Hypertension associated with diabetes 7/28/2023     Past Surgical History:   Procedure Laterality Date    ANGIOPLASTY       Family History   Problem Relation Age of Onset    No Known Problems Mother     Diabetes Father     Alzheimer's disease Father      Social History     Socioeconomic History    Marital status: Single   Tobacco Use    Smoking status: Every Day     Packs/day: 0.50     Years: 45.00     Pack years: 22.50     Types: Cigarettes    Smokeless tobacco: Never   Substance and Sexual Activity    Alcohol use: Not Currently    Drug use: Never    Sexual activity: Not Currently       Review of patient's allergies indicates:  No Known Allergies    Current Outpatient Medications:     BASAGLAR KWIKPEN U-100 INSULIN glargine 100 units/mL SubQ pen, ADMINISTER 20 UNITS UNDER THE SKIN  EVERY 12 HOURS, Disp: , Rfl:     gabapentin (NEURONTIN) 300 MG capsule, Take 300 mg by mouth 3 (three) times daily., Disp: , Rfl:     losartan-hydrochlorothiazide 100-25 mg (HYZAAR) 100-25 mg per tablet, Take 1 tablet by mouth., Disp: , Rfl:     metFORMIN (GLUCOPHAGE) 1000 MG tablet, Take 1 tablet (1,000 mg total) by mouth 2 (two) times daily., Disp: 60 tablet, Rfl: 0    rosuvastatin (CRESTOR) 20 MG tablet, Take 20 mg by mouth every evening., Disp: , Rfl:     TRULICITY 3 mg/0.5 mL pen injector, new dose Inject one pen under the skin once weekly. Rotate injection sites, Disp: , Rfl:     amLODIPine (NORVASC) 5 MG tablet, Take 5 mg by mouth once daily., Disp: , Rfl:     metoprolol succinate (TOPROL-XL) 25 MG 24 hr tablet, Take 0.5 tablets (12.5 mg total) by mouth once daily., Disp: 15 tablet, Rfl: 1    Review of Systems   Constitutional: Positive for malaise/fatigue. Negative for chills, decreased appetite, diaphoresis and fever.   HENT:  Negative for congestion, hoarse voice and nosebleeds.    Eyes:  Positive for blurred vision. Negative for pain.   Cardiovascular:  Positive for claudication, dyspnea on exertion and near-syncope. Negative for chest pain, cyanosis, irregular heartbeat, leg swelling, orthopnea, palpitations (SOME), paroxysmal nocturnal dyspnea and syncope.   Respiratory:  Positive for shortness of breath. Negative for cough, hemoptysis and wheezing.    Skin:  Negative for color change and nail changes.   Musculoskeletal:  Positive for arthritis and back pain. Negative for falls.   Gastrointestinal:  Negative for abdominal pain, dysphagia, jaundice, melena and nausea.   Genitourinary:  Negative for dysuria and flank pain.   Neurological:  Positive for loss of balance and paresthesias. Negative for brief paralysis and focal weakness.   Psychiatric/Behavioral:  Negative for altered mental status and depression.    Allergic/Immunologic: Negative for hives and persistent infections.      Objective:     "  Vitals:    07/28/23 1215   BP: (!) 143/88   Pulse: (!) 115   Weight: 99 kg (218 lb 4.1 oz)   Height: 6' 3" (1.905 m)   PainSc: 0-No pain     Body mass index is 27.28 kg/m².    Physical Exam  Constitutional:       Appearance: Normal appearance.   HENT:      Head: Normocephalic and atraumatic.   Eyes:      General: No scleral icterus.     Extraocular Movements: Extraocular movements intact.      Conjunctiva/sclera: Conjunctivae normal.   Cardiovascular:      Rate and Rhythm: Regular rhythm. Tachycardia present.      Pulses:           Carotid pulses are 2+ on the right side and 2+ on the left side.       Radial pulses are 2+ on the right side and 2+ on the left side.        Femoral pulses are 2+ on the right side with bruit and 2+ on the left side.       Posterior tibial pulses are 1+ on the right side and 1+ on the left side.      Heart sounds: Murmur heard.     No friction rub. No gallop.   Pulmonary:      Effort: Pulmonary effort is normal. Prolonged expiration present.      Breath sounds: No rales.   Abdominal:      Palpations: Abdomen is soft.      Tenderness: There is no abdominal tenderness.   Musculoskeletal:        Arms:       Cervical back: Neck supple.      Right lower leg: No edema.      Left lower leg: No edema.      Comments: R BIG TOE AMPUTATION,MISSING R INDEX FINGER   Skin:     General: Skin is warm and dry.      Capillary Refill: Capillary refill takes 2 to 3 seconds.   Neurological:      General: No focal deficit present.      Mental Status: He is alert and oriented to person, place, and time.      Cranial Nerves: No cranial nerve deficit.   Psychiatric:         Mood and Affect: Mood normal.         Behavior: Behavior normal.               ..    Chemistry    No results found for: NA, K, CL, CO2, BUN, CREATININE, GLU No results found for: CALCIUM, ALKPHOS, AST, ALT, BILITOT, ESTGFRAFRICA, EGFRNONAA         ..No results found for: CHOL  No results found for: HDL  No results found for: LDLCALC  No " results found for: TRIG  No results found for: CHOLHDL  ..  Lab Results   Component Value Date    WBC 12.30 04/05/2022    HGB 16.6 04/05/2022    HCT 49.1 04/05/2022    MCV 93 04/05/2022     04/05/2022       Test(s) Reviewed  I have reviewed the following in detail:  [] Stress test   [] Angiography   [x] Echocardiogram   [] Labs   [x] Other:       Assessment:         ICD-10-CM ICD-9-CM   1. SOB (shortness of breath)  R06.02 786.05   2. Near syncope  R55 780.2   3. Other fatigue  R53.83 780.79   4. Hypertension associated with diabetes  E11.59 250.80    I15.2 401.9   5. PVD (peripheral vascular disease) with claudication  I73.9 443.9   6. Tachycardia  R00.0 785.0   7. Tobacco use  Z72.0 305.1     Problem List Items Addressed This Visit          Pulmonary    SOB (shortness of breath) - Primary    Relevant Orders    IN OFFICE EKG 12-LEAD (to Muse)    Nuclear Stress - Cardiology Interpreted    X-Ray Chest PA And Lateral (Completed)       Cardiac/Vascular    Hypertension associated with diabetes    Relevant Medications    TRULICITY 3 mg/0.5 mL pen injector    BASAGLAR KWIKPEN U-100 INSULIN glargine 100 units/mL SubQ pen    PVD (peripheral vascular disease) with claudication    Relevant Orders    CV Ultrasound doppler arterial legs bilat    Tachycardia       Other    Near syncope    Relevant Orders    Nuclear Stress - Cardiology Interpreted    CV Ultrasound Bilateral Doppler Carotid    Other fatigue    Relevant Orders    Nuclear Stress - Cardiology Interpreted    Tobacco use        Plan:     EKG ST, WILL NEED EXTENSIVE EVALUATION AND RISK FACTORS MODIFICATION CHECK CAROTID ULTRASOUND NUCLEAR STRESS TEST ASSESS FOR ISCHEMIA ARTERIAL DOPPLER FOR PERIPHERAL VASCULAR DISEASE EXTENSIVE COUNSELING REGARDING TOBACCO CESSATION DIABETES CONTROL PATIENT ALREADY HAS ADVANCED DISEASE, INCREASED CV RISK WITH TOBACCO ABUSE AND UNCONTROLLED DIABETES FOR LONG TIME, RETURN TO CLINIC IN FEW WEEKS AFTER TESTS, ALSO CHECK CHEST X-RAY       SOB (shortness of breath)  -     IN OFFICE EKG 12-LEAD (to Muse)  -     Nuclear Stress - Cardiology Interpreted; Future  -     X-Ray Chest PA And Lateral; Future; Expected date: 07/28/2023    Near syncope  Comments:  MOSTLY ORTHOSTATIC  Orders:  -     Nuclear Stress - Cardiology Interpreted; Future  -     CV Ultrasound Bilateral Doppler Carotid; Future    Other fatigue  -     Nuclear Stress - Cardiology Interpreted; Future    Hypertension associated with diabetes    PVD (peripheral vascular disease) with claudication  Comments:  ARTERIAL DOPPLER  Orders:  -     CV Ultrasound doppler arterial legs bilat; Future    Tachycardia    Tobacco use  Comments:  COUNSELING    Other orders  -     metoprolol succinate (TOPROL-XL) 25 MG 24 hr tablet; Take 0.5 tablets (12.5 mg total) by mouth once daily.  Dispense: 15 tablet; Refill: 1    RTC Low level/low impact aerobic exercise 5x's/wk. Heart healthy diet and risk factor modification.    See labs and med orders.    Aerobic exercise 5x's/wk. Heart healthy diet and risk factor modification.    See labs and med orders.

## 2023-08-23 ENCOUNTER — PATIENT MESSAGE (OUTPATIENT)
Dept: RADIOLOGY | Facility: HOSPITAL | Age: 53
End: 2023-08-23
Payer: COMMERCIAL

## 2023-08-25 ENCOUNTER — HOSPITAL ENCOUNTER (OUTPATIENT)
Dept: RADIOLOGY | Facility: HOSPITAL | Age: 53
Discharge: HOME | End: 2023-08-25
Attending: INTERNAL MEDICINE
Payer: COMMERCIAL

## 2023-08-25 ENCOUNTER — CLINICAL SUPPORT (OUTPATIENT)
Dept: CARDIOLOGY | Facility: HOSPITAL | Age: 53
End: 2023-08-25
Attending: INTERNAL MEDICINE
Payer: COMMERCIAL

## 2023-08-25 VITALS — HEIGHT: 75 IN | WEIGHT: 218 LBS | BODY MASS INDEX: 27.1 KG/M2

## 2023-08-25 DIAGNOSIS — R53.83 OTHER FATIGUE: Chronic | ICD-10-CM

## 2023-08-25 DIAGNOSIS — R55 NEAR SYNCOPE: Chronic | ICD-10-CM

## 2023-08-25 DIAGNOSIS — I73.9 PVD (PERIPHERAL VASCULAR DISEASE) WITH CLAUDICATION: ICD-10-CM

## 2023-08-25 DIAGNOSIS — R06.02 SOB (SHORTNESS OF BREATH): ICD-10-CM

## 2023-08-25 LAB
LEFT ARM DIASTOLIC BLOOD PRESSURE: 88 MMHG
LEFT ARM SYSTOLIC BLOOD PRESSURE: 138 MMHG
LEFT CBA DIAS: 16 CM/S
LEFT CBA SYS: 87 CM/S
LEFT CCA DIST DIAS: 17 CM/S
LEFT CCA DIST SYS: 100 CM/S
LEFT CCA MID DIAS: 17 CM/S
LEFT CCA MID SYS: 106 CM/S
LEFT CCA PROX DIAS: 17 CM/S
LEFT CCA PROX SYS: 133 CM/S
LEFT ECA DIAS: 13 CM/S
LEFT ECA SYS: 116 CM/S
LEFT ICA DIST DIAS: 37 CM/S
LEFT ICA DIST SYS: 101 CM/S
LEFT ICA MID DIAS: 31 CM/S
LEFT ICA MID SYS: 90 CM/S
LEFT ICA PROX DIAS: 24 CM/S
LEFT ICA PROX SYS: 67 CM/S
LEFT VERTEBRAL DIAS: 19 CM/S
LEFT VERTEBRAL SYS: 72 CM/S
OHS CV CAROTID RIGHT ICA EDV HIGHEST: 26
OHS CV CAROTID ULTRASOUND LEFT ICA/CCA RATIO: 1.01
OHS CV CAROTID ULTRASOUND RIGHT ICA/CCA RATIO: 1.07
OHS CV PV CAROTID LEFT HIGHEST CCA: 133
OHS CV PV CAROTID LEFT HIGHEST ICA: 101
OHS CV PV CAROTID RIGHT HIGHEST CCA: 100
OHS CV PV CAROTID RIGHT HIGHEST ICA: 75
OHS CV US CAROTID LEFT HIGHEST EDV: 37
RIGHT ARM DIASTOLIC BLOOD PRESSURE: 88 MMHG
RIGHT ARM SYSTOLIC BLOOD PRESSURE: 138 MMHG
RIGHT CBA DIAS: 13 CM/S
RIGHT CBA SYS: 67 CM/S
RIGHT CCA DIST DIAS: 15 CM/S
RIGHT CCA DIST SYS: 70 CM/S
RIGHT CCA MID DIAS: 19 CM/S
RIGHT CCA MID SYS: 100 CM/S
RIGHT CCA PROX DIAS: 13 CM/S
RIGHT CCA PROX SYS: 79 CM/S
RIGHT ECA DIAS: 10 CM/S
RIGHT ECA SYS: 113 CM/S
RIGHT ICA DIST DIAS: 26 CM/S
RIGHT ICA DIST SYS: 75 CM/S
RIGHT ICA MID DIAS: 26 CM/S
RIGHT ICA MID SYS: 68 CM/S
RIGHT ICA PROX DIAS: 18 CM/S
RIGHT ICA PROX SYS: 47 CM/S
RIGHT VERTEBRAL DIAS: 8 CM/S
RIGHT VERTEBRAL SYS: 44 CM/S

## 2023-08-25 PROCEDURE — 78452 NUCLEAR STRESS - CARDIOLOGY INTERPRETED (CUPID ONLY): ICD-10-PCS | Mod: 26,,, | Performed by: INTERNAL MEDICINE

## 2023-08-25 PROCEDURE — 93880 CV US DOPPLER CAROTID (CUPID ONLY): ICD-10-PCS | Mod: 26,,, | Performed by: INTERNAL MEDICINE

## 2023-08-25 PROCEDURE — 93017 CV STRESS TEST TRACING ONLY: CPT | Mod: PO

## 2023-08-25 PROCEDURE — 93880 EXTRACRANIAL BILAT STUDY: CPT | Mod: 26,,, | Performed by: INTERNAL MEDICINE

## 2023-08-25 PROCEDURE — 78452 HT MUSCLE IMAGE SPECT MULT: CPT | Mod: PO

## 2023-08-25 PROCEDURE — 93925 LOWER EXTREMITY STUDY: CPT | Mod: PO

## 2023-08-25 PROCEDURE — 78452 HT MUSCLE IMAGE SPECT MULT: CPT | Mod: 26,,, | Performed by: INTERNAL MEDICINE

## 2023-08-25 PROCEDURE — 93880 EXTRACRANIAL BILAT STUDY: CPT | Mod: PO

## 2023-08-25 PROCEDURE — 93925 LOWER EXTREMITY STUDY: CPT | Mod: 26,,, | Performed by: INTERNAL MEDICINE

## 2023-08-25 PROCEDURE — 93016 CV STRESS TEST SUPVJ ONLY: CPT | Mod: ,,, | Performed by: INTERNAL MEDICINE

## 2023-08-25 PROCEDURE — 93018 CV STRESS TEST I&R ONLY: CPT | Mod: ,,, | Performed by: INTERNAL MEDICINE

## 2023-08-25 PROCEDURE — 63600175 PHARM REV CODE 636 W HCPCS: Mod: PO | Performed by: INTERNAL MEDICINE

## 2023-08-25 PROCEDURE — 93925 CV US DOPPLER ARTERIAL LEGS BILATERAL (CUPID ONLY): ICD-10-PCS | Mod: 26,,, | Performed by: INTERNAL MEDICINE

## 2023-08-25 PROCEDURE — A9502 TC99M TETROFOSMIN: HCPCS | Mod: PO

## 2023-08-25 PROCEDURE — 93016 NUCLEAR STRESS - CARDIOLOGY INTERPRETED (CUPID ONLY): ICD-10-PCS | Mod: ,,, | Performed by: INTERNAL MEDICINE

## 2023-08-25 PROCEDURE — 93018 PR CARDIAC STRESS TST,INTERP/REPT ONLY: ICD-10-PCS | Mod: ,,, | Performed by: INTERNAL MEDICINE

## 2023-08-25 RX ORDER — REGADENOSON 0.08 MG/ML
0.4 INJECTION, SOLUTION INTRAVENOUS ONCE
Status: COMPLETED | OUTPATIENT
Start: 2023-08-25 | End: 2023-08-25

## 2023-08-25 RX ADMIN — REGADENOSON 0.4 MG: 0.08 INJECTION, SOLUTION INTRAVENOUS at 09:08

## 2023-08-28 ENCOUNTER — PATIENT MESSAGE (OUTPATIENT)
Dept: CARDIOLOGY | Facility: CLINIC | Age: 53
End: 2023-08-28
Payer: COMMERCIAL

## 2023-08-28 LAB
CV PHARM DOSE: 0.4 MG
CV STRESS BASE HR: 96 BPM
DIASTOLIC BLOOD PRESSURE: 79 MMHG
LEFT ANT TIBIAL SYS PSV: 30 CM/S
LEFT CFA PSV: 77 CM/S
LEFT PERONEAL SYS PSV: 50 CM/S
LEFT POPLITEAL PSV: 57 CM/S
LEFT POST TIBIAL SYS PSV: 59 CM/S
LEFT PROFUNDA SYS PSV: 129 CM/S
LEFT SUPER FEMORAL DIST SYS PSV: 59 CM/S
LEFT SUPER FEMORAL MID SYS PSV: 91 CM/S
LEFT SUPER FEMORAL OSTIAL SYS PSV: 94 CM/S
LEFT SUPER FEMORAL PROX SYS PSV: 112 CM/S
LEFT TIB/PER TRUNK SYS PSV: 79 CM/S
NUC REST EJECTION FRACTION: 64
OHS CV CPX 1 MINUTE RECOVERY HEART RATE: 112 BPM
OHS CV CPX 85 PERCENT MAX PREDICTED HEART RATE MALE: 144
OHS CV CPX MAX PREDICTED HEART RATE: 169
OHS CV CPX PATIENT IS FEMALE: 0
OHS CV CPX PATIENT IS MALE: 1
OHS CV CPX PEAK DIASTOLIC BLOOD PRESSURE: 79 MMHG
OHS CV CPX PEAK HEAR RATE: 114 BPM
OHS CV CPX PEAK RATE PRESSURE PRODUCT: NORMAL
OHS CV CPX PEAK SYSTOLIC BLOOD PRESSURE: 129 MMHG
OHS CV CPX PERCENT MAX PREDICTED HEART RATE ACHIEVED: 67
OHS CV CPX RATE PRESSURE PRODUCT PRESENTING: NORMAL
OHS CV LEFT LOWER EXTREMITY ABI (NO CALC): 1.07
OHS CV PHARM TIME: 950 MIN
OHS CV RIGHT ABI LOWER EXTREMITY (NO CALC): 1.04
RIGHT ANT TIBIAL SYS PSV: 69 CM/S
RIGHT CFA PSV: 78 CM/S
RIGHT PERONEAL SYS PSV: 38 CM/S
RIGHT POPLITEAL PSV: 50 CM/S
RIGHT POST TIBIAL SYS PSV: 57 CM/S
RIGHT PROFUNDA SYS PSV: 113 CM/S
RIGHT SUPER FEMORAL DIST SYS PSV: 146 CM/S
RIGHT SUPER FEMORAL MID SYS PSV: 86 CM/S
RIGHT SUPER FEMORAL OSTIAL SYS PSV: 76 CM/S
RIGHT SUPER FEMORAL PROX SYS PSV: 105 CM/S
RIGHT TIB/PER TRUNK SYS PSV: 90 CM/S
SYSTOLIC BLOOD PRESSURE: 129 MMHG

## 2023-08-28 NOTE — PROGRESS NOTES
Subjective:    Patient ID:  Shauna Barrera is a 52 y.o. male who presents for Hypertension associated with diabetes and Shortness of Breath        HPI    DISCUSSED TESTS ABNORMAL NUCLEAR STRESS TEST WITH MODERATE-TO-LARGE SIZE REVERSIBLE INFEROLATERAL DEFECT, MILD CAROTID DISEASE AND PLAQUE NO STENOSIS, MINIMAL PERIPHERAL VASCULAR DISEASE, REPORTS SOB AND FATIGUE, RECENT HBA1C  7% AT PCP [facility], SEE ROS  Past Medical History:   Diagnosis Date    Diabetes mellitus type I     Hypertension associated with diabetes 7/28/2023     Past Surgical History:   Procedure Laterality Date    ANGIOPLASTY       Family History   Problem Relation Age of Onset    No Known Problems Mother     Diabetes Father     Alzheimer's disease Father      Social History     Socioeconomic History    Marital status: Single   Tobacco Use    Smoking status: Every Day     Current packs/day: 0.50     Average packs/day: 0.5 packs/day for 45.0 years (22.5 ttl pk-yrs)     Types: Cigarettes    Smokeless tobacco: Never   Substance and Sexual Activity    Alcohol use: Not Currently    Drug use: Never    Sexual activity: Not Currently       Review of patient's allergies indicates:  No Known Allergies    Current Outpatient Medications:     amLODIPine (NORVASC) 5 MG tablet, Take 5 mg by mouth once daily., Disp: , Rfl:     BASAGLAR KWIKPEN U-100 INSULIN glargine 100 units/mL SubQ pen, ADMINISTER 20 UNITS UNDER THE SKIN EVERY 12 HOURS, Disp: , Rfl:     gabapentin (NEURONTIN) 300 MG capsule, Take 300 mg by mouth 3 (three) times daily., Disp: , Rfl:     losartan-hydrochlorothiazide 100-25 mg (HYZAAR) 100-25 mg per tablet, Take 1 tablet by mouth., Disp: , Rfl:     metFORMIN (GLUCOPHAGE) 1000 MG tablet, Take 1 tablet (1,000 mg total) by mouth 2 (two) times daily., Disp: 60 tablet, Rfl: 0    rosuvastatin (CRESTOR) 20 MG tablet, Take 20 mg by mouth every evening., Disp: , Rfl:     TRULICITY 3 mg/0.5 mL pen injector, new dose Inject one pen under the skin once weekly.  "Rotate injection sites, Disp: , Rfl:     metoprolol succinate (TOPROL-XL) 25 MG 24 hr tablet, Take 1 tablet (25 mg total) by mouth once daily., Disp: 90 tablet, Rfl: 0    nitroGLYCERIN (NITROSTAT) 0.4 MG SL tablet, Place 1 tablet (0.4 mg total) under the tongue every 5 (five) minutes as needed for Chest pain., Disp: 25 tablet, Rfl: 0    Review of Systems   Constitutional: Positive for malaise/fatigue. Negative for chills, decreased appetite, diaphoresis and fever.   HENT:  Negative for congestion and nosebleeds.    Eyes:  Negative for blurred vision and pain.   Cardiovascular:  Positive for claudication and dyspnea on exertion. Negative for chest pain, cyanosis, irregular heartbeat, leg swelling, near-syncope, orthopnea, palpitations (SOME), paroxysmal nocturnal dyspnea and syncope.   Respiratory:  Positive for shortness of breath. Negative for cough, hemoptysis and wheezing.    Skin:  Negative for color change and nail changes.   Musculoskeletal:  Positive for back pain. Negative for falls.   Gastrointestinal:  Negative for abdominal pain, dysphagia, jaundice, melena and nausea.   Genitourinary:  Negative for dysuria and flank pain.   Neurological:  Positive for paresthesias. Negative for brief paralysis and focal weakness.   Psychiatric/Behavioral:  Negative for altered mental status and depression.    Allergic/Immunologic: Negative for persistent infections.        Objective:      Vitals:    08/29/23 1447   BP: 111/76   Pulse: 99   Weight: 96.3 kg (212 lb 4.9 oz)   Height: 6' 3" (1.905 m)   PainSc: 0-No pain     Body mass index is 26.54 kg/m².    Physical Exam  Constitutional:       Appearance: Normal appearance.   HENT:      Head: Normocephalic and atraumatic.   Eyes:      General: No scleral icterus.     Extraocular Movements: Extraocular movements intact.      Pupils: Pupils are equal, round, and reactive to light.   Cardiovascular:      Rate and Rhythm: Normal rate and regular rhythm.      Pulses:           " "Carotid pulses are 2+ on the right side and 2+ on the left side.       Radial pulses are 2+ on the right side and 2+ on the left side.        Femoral pulses are 2+ on the right side with bruit and 2+ on the left side.       Posterior tibial pulses are 1+ on the right side and 1+ on the left side.      Heart sounds: Murmur heard.      No friction rub. No gallop.   Pulmonary:      Effort: Pulmonary effort is normal. No prolonged expiration.      Breath sounds: Normal breath sounds. No rales.   Chest:      Chest wall: No tenderness.   Abdominal:      Palpations: Abdomen is soft.      Tenderness: There is no abdominal tenderness.   Musculoskeletal:        Arms:       Cervical back: Neck supple.      Right lower leg: No edema.      Left lower leg: No edema.      Comments: R BIG TOE AMPUTATION,MISSING R INDEX FINGER   Skin:     General: Skin is warm and dry.      Capillary Refill: Capillary refill takes 2 to 3 seconds.   Neurological:      General: No focal deficit present.      Mental Status: He is alert and oriented to person, place, and time.      Cranial Nerves: No cranial nerve deficit.   Psychiatric:         Mood and Affect: Mood normal.         Behavior: Behavior normal.               ..    Chemistry    No results found for: "NA", "K", "CL", "CO2", "BUN", "CREATININE", "GLU" No results found for: "CALCIUM", "ALKPHOS", "AST", "ALT", "BILITOT", "ESTGFRAFRICA", "EGFRNONAA"         ..No results found for: "CHOL"  No results found for: "HDL"  No results found for: "LDLCALC"  No results found for: "TRIG"  No results found for: "CHOLHDL"  ..  Lab Results   Component Value Date    WBC 12.30 04/05/2022    HGB 16.6 04/05/2022    HCT 49.1 04/05/2022    MCV 93 04/05/2022     04/05/2022       Test(s) Reviewed  I have reviewed the following in detail:  [x] Stress test   [] Angiography   [] Echocardiogram   [] Labs   [x] Other:       Assessment:         ICD-10-CM ICD-9-CM   1. Abnormal nuclear stress test  R94.39 794.39 "   2. Mild carotid artery disease  I77.9 447.9   3. Tobacco use  Z72.0 305.1   4. PVD (peripheral vascular disease) with claudication  I73.9 443.9   5. Hypertension associated with diabetes  E11.59 250.80    I15.2 401.9   6. SOB (shortness of breath)  R06.02 786.05   7. Equivalent angina  I20.8 413.9   8. Near syncope  R55 780.2     Problem List Items Addressed This Visit          Pulmonary    SOB (shortness of breath)    Relevant Orders    Case Request-Cath Lab: Angiogram, Coronary, with Left Heart Cath (Completed)    Vital signs    Cardiac Monitoring - Adult    Basic metabolic panel    Full code       Cardiac/Vascular    Equivalent angina (Chronic)    Relevant Orders    Case Request-Cath Lab: Angiogram, Coronary, with Left Heart Cath (Completed)    Vital signs    Cardiac Monitoring - Adult    Basic metabolic panel    Full code    Hypertension associated with diabetes    Relevant Orders    Vital signs    Cardiac Monitoring - Adult    Basic metabolic panel    Full code    PVD (peripheral vascular disease) with claudication    Abnormal nuclear stress test - Primary    Relevant Orders    Case Request-Cath Lab: Angiogram, Coronary, with Left Heart Cath (Completed)    Vital signs    Cardiac Monitoring - Adult    Basic metabolic panel    Full code    Mild carotid artery disease       Other    Near syncope    Tobacco use        Plan:         [facility] CORONARY ANGIOGRAM POSSIBLE INTERVENTION IN VIEW OF SYMPTOMS CLASS 2-3 ANGINA ABNORMAL  NUCLEAR STRESS TEST ON MULTIPLE MEDICATIONS,, RISKS AND ALTERNATIVES EXPLAINED IN DETAILS , PRN SL NTG, INCREASE TOPROL TO 25 MG AND WATCH HEART RATE, TOBACCO CESSATION EXTENSIVE COUNSELING, NO OVERT HEART FAILURE NO TIA TYPE SYMPTOM DIET EXERCISE WEIGHT LOSS DISCUSSED PLAN WITH THE PATIENT AND HIS, WIFE, INCREASED CV RISK WITH DIABETES  Abnormal nuclear stress test  -     Case Request-Cath Lab: Angiogram, Coronary, with Left Heart Cath; Standing  -     Establish IV access and maintain saline  lock; Standing  -     Hold Warfarin; Standing  -     Hold Enoxaparin/subcutaneous Heparin; Standing  -     Hold Heparin drip; Standing  -     Height and weight; Standing  -     Verify informed consent; Standing  -     Vital signs; Standing  -     Cardiac Monitoring - Adult; Standing  -     Pulse Oximetry Q4H; Standing  -     Diet NPO; Standing  -     CBC auto differential; Standing  -     Basic metabolic panel; Standing  -     Full code; Standing    Mild carotid artery disease    Tobacco use    PVD (peripheral vascular disease) with claudication    Hypertension associated with diabetes  -     Establish IV access and maintain saline lock; Standing  -     Hold Warfarin; Standing  -     Hold Enoxaparin/subcutaneous Heparin; Standing  -     Hold Heparin drip; Standing  -     Height and weight; Standing  -     Verify informed consent; Standing  -     Vital signs; Standing  -     Cardiac Monitoring - Adult; Standing  -     Pulse Oximetry Q4H; Standing  -     Diet NPO; Standing  -     CBC auto differential; Standing  -     Basic metabolic panel; Standing  -     Full code; Standing    SOB (shortness of breath)  -     Case Request-Cath Lab: Angiogram, Coronary, with Left Heart Cath; Standing  -     Establish IV access and maintain saline lock; Standing  -     Hold Warfarin; Standing  -     Hold Enoxaparin/subcutaneous Heparin; Standing  -     Hold Heparin drip; Standing  -     Height and weight; Standing  -     Verify informed consent; Standing  -     Vital signs; Standing  -     Cardiac Monitoring - Adult; Standing  -     Pulse Oximetry Q4H; Standing  -     Diet NPO; Standing  -     CBC auto differential; Standing  -     Basic metabolic panel; Standing  -     Full code; Standing    Equivalent angina  -     Case Request-Cath Lab: Angiogram, Coronary, with Left Heart Cath; Standing  -     Establish IV access and maintain saline lock; Standing  -     Hold Warfarin; Standing  -     Hold Enoxaparin/subcutaneous Heparin;  Standing  -     Hold Heparin drip; Standing  -     Height and weight; Standing  -     Verify informed consent; Standing  -     Vital signs; Standing  -     Cardiac Monitoring - Adult; Standing  -     Pulse Oximetry Q4H; Standing  -     Diet NPO; Standing  -     CBC auto differential; Standing  -     Basic metabolic panel; Standing  -     Full code; Standing    Near syncope  Comments:  RESOLVED    Other orders  -     nitroGLYCERIN (NITROSTAT) 0.4 MG SL tablet; Place 1 tablet (0.4 mg total) under the tongue every 5 (five) minutes as needed for Chest pain.  Dispense: 25 tablet; Refill: 0  -     0.9%  NaCl infusion  -     aspirin chewable tablet 81 mg  -     clopidogreL tablet 300 mg  -     metoprolol succinate (TOPROL-XL) 25 MG 24 hr tablet; Take 1 tablet (25 mg total) by mouth once daily.  Dispense: 90 tablet; Refill: 0    RTC Low level/low impact aerobic exercise 5x's/wk. Heart healthy diet and risk factor modification.    See labs and med orders.    Aerobic exercise 5x's/wk. Heart healthy diet and risk factor modification.    See labs and med orders.

## 2023-08-29 ENCOUNTER — OFFICE VISIT (OUTPATIENT)
Dept: CARDIOLOGY | Facility: CLINIC | Age: 53
End: 2023-08-29
Payer: COMMERCIAL

## 2023-08-29 VITALS
HEIGHT: 75 IN | DIASTOLIC BLOOD PRESSURE: 76 MMHG | WEIGHT: 212.31 LBS | HEART RATE: 99 BPM | BODY MASS INDEX: 26.4 KG/M2 | SYSTOLIC BLOOD PRESSURE: 111 MMHG

## 2023-08-29 DIAGNOSIS — R94.39 ABNORMAL NUCLEAR STRESS TEST: Primary | ICD-10-CM

## 2023-08-29 DIAGNOSIS — I15.2 HYPERTENSION ASSOCIATED WITH DIABETES: ICD-10-CM

## 2023-08-29 DIAGNOSIS — I77.9 MILD CAROTID ARTERY DISEASE: Chronic | ICD-10-CM

## 2023-08-29 DIAGNOSIS — I73.9 PVD (PERIPHERAL VASCULAR DISEASE) WITH CLAUDICATION: ICD-10-CM

## 2023-08-29 DIAGNOSIS — Z72.0 TOBACCO USE: Chronic | ICD-10-CM

## 2023-08-29 DIAGNOSIS — R55 NEAR SYNCOPE: ICD-10-CM

## 2023-08-29 DIAGNOSIS — I20.89 EQUIVALENT ANGINA: Chronic | ICD-10-CM

## 2023-08-29 DIAGNOSIS — E11.59 HYPERTENSION ASSOCIATED WITH DIABETES: ICD-10-CM

## 2023-08-29 DIAGNOSIS — R06.02 SOB (SHORTNESS OF BREATH): Chronic | ICD-10-CM

## 2023-08-29 PROCEDURE — 99999 PR PBB SHADOW E&M-EST. PATIENT-LVL III: CPT | Mod: PBBFAC,,, | Performed by: INTERNAL MEDICINE

## 2023-08-29 PROCEDURE — 99214 OFFICE O/P EST MOD 30 MIN: CPT | Mod: S$GLB,,, | Performed by: INTERNAL MEDICINE

## 2023-08-29 PROCEDURE — 99214 PR OFFICE/OUTPT VISIT, EST, LEVL IV, 30-39 MIN: ICD-10-PCS | Mod: S$GLB,,, | Performed by: INTERNAL MEDICINE

## 2023-08-29 PROCEDURE — 99999 PR PBB SHADOW E&M-EST. PATIENT-LVL III: ICD-10-PCS | Mod: PBBFAC,,, | Performed by: INTERNAL MEDICINE

## 2023-08-29 RX ORDER — NITROGLYCERIN 0.4 MG/1
0.4 TABLET SUBLINGUAL EVERY 5 MIN PRN
Qty: 25 TABLET | Refills: 0 | Status: SHIPPED | OUTPATIENT
Start: 2023-08-29 | End: 2023-09-07

## 2023-08-29 RX ORDER — NAPROXEN SODIUM 220 MG/1
81 TABLET, FILM COATED ORAL ONCE
Status: CANCELLED | OUTPATIENT
Start: 2023-08-29 | End: 2023-08-29

## 2023-08-29 RX ORDER — METOPROLOL SUCCINATE 25 MG/1
25 TABLET, EXTENDED RELEASE ORAL DAILY
Qty: 90 TABLET | Refills: 0 | Status: SHIPPED | OUTPATIENT
Start: 2023-08-29 | End: 2023-09-22 | Stop reason: DRUGHIGH

## 2023-08-29 RX ORDER — SODIUM CHLORIDE 9 MG/ML
INJECTION, SOLUTION INTRAVENOUS ONCE
Status: CANCELLED | OUTPATIENT
Start: 2023-08-29 | End: 2023-08-29

## 2023-08-29 RX ORDER — CLOPIDOGREL BISULFATE 75 MG/1
300 TABLET ORAL ONCE
Status: CANCELLED | OUTPATIENT
Start: 2023-08-29 | End: 2023-08-29

## 2023-08-29 NOTE — PATIENT INSTRUCTIONS
Angiogram    Arrive for procedure at: [facility] MON. 9/11/23 @ 7 AM.  YOU MAY ENTER THROUGH THE MAIN ENTRANCE.  LET THEM KNOW YOU ARE THERE FOR AN OUTPATIENT PROCEDURE.  THE PROCEDURE WILL START AT 9 AM WITH DR. MARTINEZ.    You will receive a phone call from [facility] Pre-Op Department with further instructions and exact arrival time prior to your scheduled procedure.      FASTING: You MAY NOT have anything to eat or drink AFTER MIDNIGHT the day before your procedure.       MEDICATIONS: You may take your regular morning medications with water. If there are any medications that you should not take, you will be instructed to hold them for that morning.    CARDIOLOGY PRE-PROCEDURE MEDICATION ORDERS:  ** Please hold any medications that are checked below:    HOLD   # OF DAYS TO HOLD    Metformin    Day before procedure & morning of procedure          WHAT TO EXPECT:    How long will the procedure take?  The procedure will take an average of 1 - 2 hours to perform.  After the procedure, you will need to lay flat for around 4 - 6 hours to minimize bleeding from the puncture site. If the wrist is accessed you will need to keep your arm still as instructed by the nurse.    When can I go home?  You may be able to be discharged home that same afternoon if there were no complications.  If you have one of the following: balloon; stent; pacemaker or defibrillator procedures, you may spend one night for observation.  Your doctor will determine your discharge based upon your progress.  The results of your procedure will be discussed with you before you are discharged.  Any further testing or procedures will be scheduled for you either before you leave or you will be instructed to call for a future appointment.      TRANSPORTATION:  PLEASE ARRANGE TO HAVE SOMEONE DRIVE YOU HOME FOLLOWING YOUR PROCEDURE, YOU WILL NOT BE ALLOWED TO DRIVE.

## 2023-08-30 PROBLEM — I20.89 EQUIVALENT ANGINA: Chronic | Status: ACTIVE | Noted: 2023-08-30

## 2023-09-21 NOTE — PROGRESS NOTES
Subjective:    Patient ID:  Shauna Barrera is a 52 y.o. male who presents for Follow-up and Coronary Artery Disease        HPI  DISCUSSED TESTS , CHEST X-RAY, MILD PVD ATHEROSCLEROSIS CIRRHOSIS, MILD CAROTID PLAQUE BILATERALLY ABNORMAL NUCLEAR STRESS TEST AND CARDIAC CATHETERIZATION WITH SIGNIFICANT CAD, PCI OF A LARGE DIAGONAL BRANCH MODERATE DISEASE IN AND ANEURYSMAL RCA WITH NORMAL IFR, FEELS BETTER, LESS LIGHTHEADENESS, WORKING ON SMOKING , CHRONIC MARIA TERESA PAIN, DOG SICK, SEE ROS    Past Medical History:   Diagnosis Date    Anticoagulant long-term use     Arthritis     Coronary artery disease     Diabetes mellitus type I     Hypertension associated with diabetes 07/28/2023    PONV (postoperative nausea and vomiting)      Past Surgical History:   Procedure Laterality Date    ANGIOGRAM, CORONARY, WITH LEFT HEART CATHETERIZATION  9/11/2023    Procedure: Left Heart Cath;  Surgeon: Crissy Tucker MD;  Location: [facility] CATH;  Service: Cardiology;;    BACK SURGERY  2018    FINGER AMPUTATION Right     index finger    FRACTIONAL FLOW RESERVE (FFR), CORONARY  9/11/2023    Procedure: (IFR) RCA;  Surgeon: Crissy Tucker MD;  Location: [facility] CATH;  Service: Cardiology;;    KNEE ARTHROSCOPY Right     torn meniscus    PERCUTANEOUS CORONARY INTERVENTION, ARTERY  9/11/2023    Procedure: SAAD Diagonal;  Surgeon: Crissy Tucker MD;  Location: [facility] CATH;  Service: Cardiology;;    TOE AMPUTATION Right     great toe     Family History   Problem Relation Age of Onset    No Known Problems Mother     Diabetes Father     Alzheimer's disease Father      Social History     Socioeconomic History    Marital status: Significant Other   Tobacco Use    Smoking status: Every Day     Current packs/day: 0.50     Average packs/day: 0.5 packs/day for 45.0 years (22.5 ttl pk-yrs)     Types: Cigarettes    Smokeless tobacco: Never   Substance and Sexual Activity    Alcohol use: Not Currently    Drug use: Never    Sexual activity: Not Currently       Review of  patient's allergies indicates:  No Known Allergies    Current Outpatient Medications:     amLODIPine (NORVASC) 5 MG tablet, Take 5 mg by mouth every morning., Disp: , Rfl:     aspirin (ECOTRIN) 81 MG EC tablet, Take by mouth once daily., Disp: , Rfl:     BASAGLAR KWIKPEN U-100 INSULIN glargine 100 units/mL SubQ pen, ADMINISTER 20 UNITS UNDER THE SKIN EVERY 12 HOURS, Disp: , Rfl:     clopidogreL (PLAVIX) 75 mg tablet, Take 1 tablet (75 mg total) by mouth once daily., Disp: 30 tablet, Rfl: 11    gabapentin (NEURONTIN) 300 MG capsule, Take 300 mg by mouth 3 (three) times daily., Disp: , Rfl:     losartan-hydrochlorothiazide 100-25 mg (HYZAAR) 100-25 mg per tablet, Take 1 tablet by mouth every morning., Disp: , Rfl:     metFORMIN (GLUCOPHAGE) 1000 MG tablet, Take 1 tablet (1,000 mg total) by mouth 2 (two) times daily., Disp: 60 tablet, Rfl: 0    nitroGLYCERIN (NITROSTAT) 0.4 MG SL tablet, DISSOLVE ONE TABLET UNDER TONGUE AS NEEDED FOR CHEST PAIN EVERY 5 MINUTES, Disp: 25 tablet, Rfl: 0    rosuvastatin (CRESTOR) 20 MG tablet, Take 20 mg by mouth every evening., Disp: , Rfl:     TRULICITY 3 mg/0.5 mL pen injector, every 7 days. Saturday, Disp: , Rfl:     metoprolol succinate (TOPROL-XL) 25 MG 24 hr tablet, Take 1.5 tablets (37.5 mg total) by mouth once daily., Disp: 135 tablet, Rfl: 0    Review of Systems   Constitutional: Negative for chills, decreased appetite, diaphoresis, fever and malaise/fatigue.   HENT:  Negative for congestion and nosebleeds.    Eyes:  Negative for blurred vision and pain.   Cardiovascular:  Negative for chest pain, claudication, cyanosis, dyspnea on exertion, irregular heartbeat, leg swelling, near-syncope, orthopnea, palpitations (SOME), paroxysmal nocturnal dyspnea and syncope.   Respiratory:  Negative for cough, hemoptysis, shortness of breath and wheezing.    Skin:  Negative for color change and nail changes.   Musculoskeletal:  Positive for back pain. Negative for falls.   Gastrointestinal:   "Negative for abdominal pain, dysphagia, jaundice, melena and nausea.   Genitourinary:  Negative for dysuria and flank pain.   Neurological:  Negative for brief paralysis, focal weakness and light-headedness.   Psychiatric/Behavioral:  Negative for altered mental status and depression.    Allergic/Immunologic: Negative for hives and persistent infections.        Objective:      Vitals:    09/22/23 1207   BP: 129/89   Pulse: 110   Weight: 97.9 kg (215 lb 13.3 oz)   Height: 6' 3" (1.905 m)   PainSc:   6   PainLoc: Back     Body mass index is 26.98 kg/m².    Physical Exam  Constitutional:       Appearance: Normal appearance.   HENT:      Head: Normocephalic and atraumatic.   Eyes:      General: No scleral icterus.     Extraocular Movements: Extraocular movements intact.      Conjunctiva/sclera: Conjunctivae normal.      Pupils: Pupils are equal, round, and reactive to light.   Cardiovascular:      Rate and Rhythm: Regular rhythm. Tachycardia present.      Pulses:           Carotid pulses are 2+ on the right side and 2+ on the left side.       Radial pulses are 2+ on the right side and 2+ on the left side.        Femoral pulses are 2+ on the right side with bruit and 2+ on the left side.       Posterior tibial pulses are 1+ on the right side and 2+ on the left side.      Heart sounds: Murmur heard.      Systolic murmur is present with a grade of 1/6.      No friction rub. No gallop.      Comments: R RADIAL OK,.  Pulmonary:      Effort: Pulmonary effort is normal. No prolonged expiration.      Breath sounds: Normal breath sounds. No rales.   Chest:      Chest wall: No tenderness.   Abdominal:      Palpations: Abdomen is soft.      Tenderness: There is no abdominal tenderness.   Musculoskeletal:        Arms:       Cervical back: Neck supple.      Right lower leg: No edema.      Left lower leg: No edema.      Comments: R BIG TOE AMPUTATION,MISSING R INDEX FINGER   Skin:     General: Skin is warm and dry.      Capillary " "Refill: Capillary refill takes 2 to 3 seconds.   Neurological:      General: No focal deficit present.      Mental Status: He is alert and oriented to person, place, and time.      Cranial Nerves: No cranial nerve deficit.   Psychiatric:         Mood and Affect: Mood normal.         Behavior: Behavior normal.               ..    Chemistry        Component Value Date/Time     09/11/2023 0756    K 3.7 09/11/2023 0756     09/11/2023 0756    CO2 29 09/11/2023 0756    BUN 23 (H) 09/11/2023 0756    CREATININE 0.91 09/11/2023 0756     (H) 09/11/2023 0756        Component Value Date/Time    CALCIUM 9.5 09/11/2023 0756    ALKPHOS 70 09/11/2023 0756    AST 30 09/11/2023 0756    ALT 46 09/11/2023 0756    BILITOT 1.2 09/11/2023 0756            ..No results found for: "CHOL"  No results found for: "HDL"  No results found for: "LDLCALC"  No results found for: "TRIG"  No results found for: "CHOLHDL"  ..  Lab Results   Component Value Date    WBC 9.30 09/11/2023    HGB 15.1 09/11/2023    HCT 42.8 09/11/2023    MCV 87 09/11/2023     09/11/2023       Test(s) Reviewed  I have reviewed the following in detail:  [x] Stress test   [x] Angiography   [] Echocardiogram   [x] Labs   [x] Other:       Assessment:         ICD-10-CM ICD-9-CM   1. Coronary artery disease involving native coronary artery of native heart without angina pectoris  I25.10 414.01   2. Long term (current) use of antithrombotics/antiplatelets  Z79.02 V58.63   3. Tobacco use  Z72.0 305.1   4. Mild carotid artery disease  I77.9 447.9   5. Hypertension associated with diabetes  E11.59 250.80    I15.2 401.9   6. Stented coronary artery  Z95.5 V45.82   7. Tachycardia  R00.0 785.0     Problem List Items Addressed This Visit          Cardiac/Vascular    Hypertension associated with diabetes    Relevant Orders    Comprehensive Metabolic Panel    Tachycardia    Mild carotid artery disease    Coronary artery disease involving native coronary artery of " native heart without angina pectoris - Primary    Relevant Orders    Comprehensive Metabolic Panel    Lipid Panel    Stented coronary artery       Hematology    Long term (current) use of antithrombotics/antiplatelets    Relevant Orders    Comprehensive Metabolic Panel    Hemoglobin       Other    Tobacco use        Plan:     EXTENSIVE TOBACCO CESSATION COUNSELING INCREASE TOPROL TO 37.5 MG WATCH HEART RATE AND BLOOD PRESSURE, FOLLOW-UP WITH PCP REGARDING COPD ETCETERA,ALL CV CLINICALLY RELATIVELY STABLE, NO ANGINA, NO HF, NO TIA, NO CLINICAL ARRHYTHMIA,CONTINUE CURRENT MEDS, EDUCATION, DIET, EXERCISE , WALKING EXERCISE RETURN TO CLINIC IN 2-3 MONTHS WITH LABS      Coronary artery disease involving native coronary artery of native heart without angina pectoris  -     Comprehensive Metabolic Panel; Future; Expected date: 09/22/2023  -     Lipid Panel; Future; Expected date: 09/22/2023    Long term (current) use of antithrombotics/antiplatelets  -     Comprehensive Metabolic Panel; Future; Expected date: 09/22/2023  -     Hemoglobin; Future; Expected date: 10/22/2023    Tobacco use  Comments:  COUNSELING    Mild carotid artery disease    Hypertension associated with diabetes  -     Comprehensive Metabolic Panel; Future; Expected date: 09/22/2023    Stented coronary artery    Tachycardia  Comments:  TOBACCO CESSATION INCREASE BETA-BLOCKER    Other orders  -     metoprolol succinate (TOPROL-XL) 25 MG 24 hr tablet; Take 1.5 tablets (37.5 mg total) by mouth once daily.  Dispense: 135 tablet; Refill: 0    RTC Low level/low impact aerobic exercise 5x's/wk. Heart healthy diet and risk factor modification.    See labs and med orders.    Aerobic exercise 5x's/wk. Heart healthy diet and risk factor modification.    See labs and med orders.

## 2023-09-22 ENCOUNTER — OFFICE VISIT (OUTPATIENT)
Dept: CARDIOLOGY | Facility: CLINIC | Age: 53
End: 2023-09-22
Payer: COMMERCIAL

## 2023-09-22 VITALS
DIASTOLIC BLOOD PRESSURE: 89 MMHG | BODY MASS INDEX: 26.83 KG/M2 | SYSTOLIC BLOOD PRESSURE: 129 MMHG | WEIGHT: 215.81 LBS | HEIGHT: 75 IN | HEART RATE: 110 BPM

## 2023-09-22 DIAGNOSIS — R00.0 TACHYCARDIA: Chronic | ICD-10-CM

## 2023-09-22 DIAGNOSIS — I77.9 MILD CAROTID ARTERY DISEASE: ICD-10-CM

## 2023-09-22 DIAGNOSIS — Z79.02 LONG TERM (CURRENT) USE OF ANTITHROMBOTICS/ANTIPLATELETS: ICD-10-CM

## 2023-09-22 DIAGNOSIS — Z72.0 TOBACCO USE: Chronic | ICD-10-CM

## 2023-09-22 DIAGNOSIS — E11.59 HYPERTENSION ASSOCIATED WITH DIABETES: Chronic | ICD-10-CM

## 2023-09-22 DIAGNOSIS — Z95.5 STENTED CORONARY ARTERY: ICD-10-CM

## 2023-09-22 DIAGNOSIS — I25.10 CORONARY ARTERY DISEASE INVOLVING NATIVE CORONARY ARTERY OF NATIVE HEART WITHOUT ANGINA PECTORIS: Primary | Chronic | ICD-10-CM

## 2023-09-22 DIAGNOSIS — I15.2 HYPERTENSION ASSOCIATED WITH DIABETES: Chronic | ICD-10-CM

## 2023-09-22 PROCEDURE — 99214 OFFICE O/P EST MOD 30 MIN: CPT | Mod: S$GLB,,, | Performed by: INTERNAL MEDICINE

## 2023-09-22 PROCEDURE — 99999 PR PBB SHADOW E&M-EST. PATIENT-LVL III: ICD-10-PCS | Mod: PBBFAC,,, | Performed by: INTERNAL MEDICINE

## 2023-09-22 PROCEDURE — 99214 PR OFFICE/OUTPT VISIT, EST, LEVL IV, 30-39 MIN: ICD-10-PCS | Mod: S$GLB,,, | Performed by: INTERNAL MEDICINE

## 2023-09-22 PROCEDURE — 99999 PR PBB SHADOW E&M-EST. PATIENT-LVL III: CPT | Mod: PBBFAC,,, | Performed by: INTERNAL MEDICINE

## 2023-09-22 RX ORDER — METOPROLOL SUCCINATE 25 MG/1
37.5 TABLET, EXTENDED RELEASE ORAL DAILY
Qty: 135 TABLET | Refills: 0 | Status: SHIPPED | OUTPATIENT
Start: 2023-09-22 | End: 2024-09-21

## 2023-09-25 ENCOUNTER — OFFICE VISIT (OUTPATIENT)
Dept: PAIN MEDICINE | Facility: CLINIC | Age: 53
End: 2023-09-25
Payer: COMMERCIAL

## 2023-09-25 ENCOUNTER — HOSPITAL ENCOUNTER (OUTPATIENT)
Dept: RADIOLOGY | Facility: HOSPITAL | Age: 53
Discharge: HOME | End: 2023-09-25
Attending: PHYSICIAN ASSISTANT
Payer: COMMERCIAL

## 2023-09-25 VITALS
HEIGHT: 75 IN | SYSTOLIC BLOOD PRESSURE: 126 MMHG | HEART RATE: 110 BPM | WEIGHT: 214.63 LBS | DIASTOLIC BLOOD PRESSURE: 88 MMHG | BODY MASS INDEX: 26.69 KG/M2

## 2023-09-25 DIAGNOSIS — G89.29 CHRONIC BILATERAL LOW BACK PAIN WITH BILATERAL SCIATICA: ICD-10-CM

## 2023-09-25 DIAGNOSIS — M54.41 CHRONIC BILATERAL LOW BACK PAIN WITH BILATERAL SCIATICA: ICD-10-CM

## 2023-09-25 DIAGNOSIS — M54.50 LUMBAR BACK PAIN: ICD-10-CM

## 2023-09-25 DIAGNOSIS — M54.42 CHRONIC BILATERAL LOW BACK PAIN WITH BILATERAL SCIATICA: ICD-10-CM

## 2023-09-25 DIAGNOSIS — Z98.1 HISTORY OF LUMBAR FUSION: ICD-10-CM

## 2023-09-25 DIAGNOSIS — M54.50 LUMBAR BACK PAIN: Primary | ICD-10-CM

## 2023-09-25 PROCEDURE — 72114 X-RAY EXAM L-S SPINE BENDING: CPT | Mod: 26,,, | Performed by: RADIOLOGY

## 2023-09-25 PROCEDURE — 72114 X-RAY EXAM L-S SPINE BENDING: CPT | Mod: TC,PO

## 2023-09-25 PROCEDURE — 99203 OFFICE O/P NEW LOW 30 MIN: CPT | Mod: S$GLB,,, | Performed by: PHYSICIAN ASSISTANT

## 2023-09-25 PROCEDURE — 99999 PR PBB SHADOW E&M-EST. PATIENT-LVL IV: ICD-10-PCS | Mod: PBBFAC,,, | Performed by: PHYSICIAN ASSISTANT

## 2023-09-25 PROCEDURE — 72114 XR LUMBAR SPINE 5 VIEW WITH FLEX AND EXT: ICD-10-PCS | Mod: 26,,, | Performed by: RADIOLOGY

## 2023-09-25 PROCEDURE — 99999 PR PBB SHADOW E&M-EST. PATIENT-LVL IV: CPT | Mod: PBBFAC,,, | Performed by: PHYSICIAN ASSISTANT

## 2023-09-25 PROCEDURE — 99203 PR OFFICE/OUTPT VISIT, NEW, LEVL III, 30-44 MIN: ICD-10-PCS | Mod: S$GLB,,, | Performed by: PHYSICIAN ASSISTANT

## 2023-09-25 RX ORDER — TIZANIDINE 4 MG/1
4 TABLET ORAL NIGHTLY PRN
Qty: 40 TABLET | Refills: 0 | Status: SHIPPED | OUTPATIENT
Start: 2023-09-25

## 2023-09-25 NOTE — PROGRESS NOTES
[facility] New Patient Evaluation      Referred by: Dominickreferral Self    PCP:  Keyla Ralph MD    CC:   Chief Complaint   Patient presents with    Low-back Pain          HPI:   Shauna Barrera is a 52 y.o. year old male patient who has a past medical history of Anticoagulant long-term use, Arthritis, Coronary artery disease, Diabetes mellitus type I, Hypertension associated with diabetes, and PONV (postoperative nausea and vomiting). He presents in self referral for lower back and leg pain.   He had lumbar fracture in 2017 and lumbar surgery in 2018 L4/5 fusion.  He did well after surgery.  Current pain started in the beginning of 2023 while recovering from right toe amputation.  He was sedentary with recovery.  Continues to be sedentary during the day because of pain in the back.  Pain is felt across the lwoer back with radiation around the waist and into the bilateral legs in a generalized distribution to the feet.  Pain eases with a hot shower and sleep.  He is taking gabapentin, and using marijuana for pain.      Denies bowel/ bladder incontinence.    He has had cardiac stent placed 2 weeks ago. Continues to follow with cardiology.    Past and current medications:  Antineuropathics:  gabpaentin  NSAIDs:  Antidepressants:  Muscle relaxers:  Opioids:  Antiplatelets/Anticoagulants:  plavix, ASA  Others:  marijauana    Physical Therapy/ Chiropractic care:  none    Pain Intervention History:  none    Past Spine Surgical History:  none        History:    Current Outpatient Medications:     amLODIPine (NORVASC) 5 MG tablet, Take 5 mg by mouth every morning., Disp: , Rfl:     aspirin (ECOTRIN) 81 MG EC tablet, Take by mouth once daily., Disp: , Rfl:     BASAGLAR KWIKPEN U-100 INSULIN glargine 100 units/mL SubQ pen, ADMINISTER 20 UNITS UNDER THE SKIN EVERY 12 HOURS, Disp: , Rfl:     clopidogreL (PLAVIX) 75 mg tablet, Take 1 tablet (75 mg total) by mouth once daily., Disp: 30 tablet, Rfl: 11    gabapentin  (NEURONTIN) 300 MG capsule, Take 300 mg by mouth 3 (three) times daily., Disp: , Rfl:     losartan-hydrochlorothiazide 100-25 mg (HYZAAR) 100-25 mg per tablet, Take 1 tablet by mouth every morning., Disp: , Rfl:     metoprolol succinate (TOPROL-XL) 25 MG 24 hr tablet, Take 1.5 tablets (37.5 mg total) by mouth once daily., Disp: 135 tablet, Rfl: 0    nitroGLYCERIN (NITROSTAT) 0.4 MG SL tablet, DISSOLVE ONE TABLET UNDER TONGUE AS NEEDED FOR CHEST PAIN EVERY 5 MINUTES, Disp: 25 tablet, Rfl: 0    rosuvastatin (CRESTOR) 20 MG tablet, Take 20 mg by mouth every evening., Disp: , Rfl:     TRULICITY 3 mg/0.5 mL pen injector, every 7 days. Saturday, Disp: , Rfl:     metFORMIN (GLUCOPHAGE) 1000 MG tablet, Take 1 tablet (1,000 mg total) by mouth 2 (two) times daily., Disp: 60 tablet, Rfl: 0    tiZANidine (ZANAFLEX) 4 MG tablet, Take 1 tablet (4 mg total) by mouth nightly as needed (muscle spasms/ pain)., Disp: 40 tablet, Rfl: 0    Past Medical History:   Diagnosis Date    Anticoagulant long-term use     Arthritis     Coronary artery disease     Diabetes mellitus type I     Hypertension associated with diabetes 07/28/2023    PONV (postoperative nausea and vomiting)        Past Surgical History:   Procedure Laterality Date    ANGIOGRAM, CORONARY, WITH LEFT HEART CATHETERIZATION  9/11/2023    Procedure: Left Heart Cath;  Surgeon: Crissy Tucker MD;  Location: [facility];  Service: Cardiology;;    BACK SURGERY  2018    FINGER AMPUTATION Right     index finger    FRACTIONAL FLOW RESERVE (FFR), CORONARY  9/11/2023    Procedure: (IFR) RCA;  Surgeon: Crissy Tucker MD;  Location: [facility];  Service: Cardiology;;    KNEE ARTHROSCOPY Right     torn meniscus    PERCUTANEOUS CORONARY INTERVENTION, ARTERY  9/11/2023    Procedure: SAAD Diagonal;  Surgeon: Crissy Tucker MD;  Location: [facility];  Service: Cardiology;;    TOE AMPUTATION Right     great toe       Family History   Problem Relation Age of Onset    No Known Problems Mother      "Diabetes Father     Alzheimer's disease Father        Social History     Socioeconomic History    Marital status: Significant Other   Tobacco Use    Smoking status: Every Day     Current packs/day: 0.50     Average packs/day: 0.5 packs/day for 45.0 years (22.5 ttl pk-yrs)     Types: Cigarettes    Smokeless tobacco: Never   Substance and Sexual Activity    Alcohol use: Not Currently    Drug use: Never    Sexual activity: Not Currently       Review of patient's allergies indicates:  No Known Allergies    Labs:  Lab Results   Component Value Date    HGBA1C 13.7 (H) 04/05/2022       Lab Results   Component Value Date    WBC 9.30 09/11/2023    HGB 15.1 09/11/2023    HCT 42.8 09/11/2023    MCV 87 09/11/2023     09/11/2023           Review of Systems:  Low back pain.  Leg pain..  Balance of review of systems is negative.    Physical Exam:  Vitals:    09/25/23 1500   BP: 126/88   Pulse: 110   Weight: 97.3 kg (214 lb 9.9 oz)   Height: 6' 3" (1.905 m)   PainSc:   8   PainLoc: Back     Body mass index is 26.83 kg/m².    Gen: NAD  Psych: mood appropriate for given condition  HEENT: eyes anicteric   CV: RRR, 2+ radial pulse  HEENT: anicteric   Respiratory: non-labored, no signs of respiratory distress  Abd: non-distended  Skin: warm, dry and intact.  Gait: Able to heel walk, toe walk. Antalgic gait.     Coordination:   Tandem walking coordination: normal    Cervical spine: ROM is full in flexion, extension and lateral rotation without increased pain.  Spurling's maneuver causes no neck pain to either side.  Myofascial exam: No Tenderness to palpation across cervical paraspinous region bilaterally.    Lumbar spine:  Lumbar spine: ROM is full with flexion extension and oblique extension with no increased pain.    Nawaf's test causes no increased pain on either side.    Supine straight leg raise is negative bilaterally.    Internal and external rotation of the hip causes no increased pain on either side.  Myofascial exam: " No tenderness to palpation across lumbar paraspinous muscles. No tenderness to palpation over the bilateral greater trochanters and bilateral SI joint    Sensory:  Intact and symmetrical to light touch in C4-T1 dermatomes bilaterally. Intact and symmetrical to light touch in L1-S1 dermatomes bilaterally, excepte decreased at the toes consistent with diabetic neuropathy    Motor:    Right Left   C4 Shoulder Abduction  5  5   C5 Elbow Flexion    5  5   C6 Wrist Extension  5  5   C7 Elbow Extension   5  5   C8/T1 Hand Intrinsics   5  5        Right Left   L2/3 Iliacus Hip flexion  5  5   L3/4 Qudratus Femoris Knee Extension  5  5   L4/5 Tib Anterior Ankle Dorsiflexion   5  5   L5/S1 Extensor Hallicus Longus Great toe extension  5  5   S1/S2 Gastroc/Soleus Plantar Flexion  5  5      Right Left   Triceps DTR 1+ 1+   Biceps DTR 1+ 1+   Brachioradialis DTR 1+ 1+   Patellar DTR 1+ 1+   Achilles DTR 1+ 1+   Ruvalcaba Absent  Absent   Clonus Absent Absent   Babinski Absent Absent       Imaging:  No spine imaging.       Assessment:   Shauna Barrera is a 52 y.o. year old male patient who has a past medical history of Anticoagulant long-term use, Arthritis, Coronary artery disease, Diabetes mellitus type I, Hypertension associated with diabetes, and PONV (postoperative nausea and vomiting). He presents in self referral for lower back pain and leg pain.  Possible lumbar radiculpathy  - new issue vs scar tissue from prior surgery.  Cannot exclude vascular claudication - he is following with cardiology.    Problem List Items Addressed This Visit    None  Visit Diagnoses       Lumbar back pain    -  Primary    Relevant Medications    tiZANidine (ZANAFLEX) 4 MG tablet    Other Relevant Orders    Ambulatory referral/consult to Physical/Occupational Therapy    X-Ray Lumbar Complete Including Flex And Ext    Chronic bilateral low back pain with bilateral sciatica        Relevant Medications    tiZANidine (ZANAFLEX) 4 MG tablet    Other  Relevant Orders    Ambulatory referral/consult to Physical/Occupational Therapy    X-Ray Lumbar Complete Including Flex And Ext    History of lumbar fusion        Relevant Medications    tiZANidine (ZANAFLEX) 4 MG tablet    Other Relevant Orders    Ambulatory referral/consult to Physical/Occupational Therapy    X-Ray Lumbar Complete Including Flex And Ext            Plan:   - xray today to assess hardware.    - refer to PT to help with back and leg pain  - zanalfex to help with spasms.   - tylenol  - encoaurged movement and activity; being sedencatry will continue to exacerbate pain.      Follow Up: RTC in 4 weeks to monitor progress    : Reviewed and consistent with medication use as prescribed.    Thank you for referring this interesting patient, and I look forward to continuing to collaborate in his care.        Alexus Diop PA-C  [facility]

## 2023-09-26 DIAGNOSIS — M54.42 CHRONIC BILATERAL LOW BACK PAIN WITH BILATERAL SCIATICA: ICD-10-CM

## 2023-09-26 DIAGNOSIS — M54.41 CHRONIC BILATERAL LOW BACK PAIN WITH BILATERAL SCIATICA: ICD-10-CM

## 2023-09-26 DIAGNOSIS — G89.29 CHRONIC BILATERAL LOW BACK PAIN WITH BILATERAL SCIATICA: ICD-10-CM

## 2023-09-26 DIAGNOSIS — M54.50 LUMBAR BACK PAIN: Primary | ICD-10-CM

## 2023-09-26 DIAGNOSIS — Z98.1 HISTORY OF LUMBAR FUSION: ICD-10-CM

## 2023-09-27 ENCOUNTER — TELEPHONE (OUTPATIENT)
Dept: PAIN MEDICINE | Facility: CLINIC | Age: 53
End: 2023-09-27
Payer: COMMERCIAL

## 2023-09-27 NOTE — TELEPHONE ENCOUNTER
I spoke with Mr. Barrera and let him know Alexus Diop advised that it is ok to proceed with physical therapy to help with pain. His follow up appointment has been rescheduled to 10-6-23 to review the results of the CT Scan, he indicated understanding.

## 2023-09-27 NOTE — TELEPHONE ENCOUNTER
It is ok to proceed with PT to help with pain.    You can move up his follow up to sooner after the CT scan if he is more comfortable with that to discuss results further.

## 2023-09-27 NOTE — TELEPHONE ENCOUNTER
----- Message from Alexus Diop PA-C sent at 9/26/2023  4:35 PM CDT -----  Results Reviewed.  Please call with below:    Lower back xrays show surgical hardware in proper place, but some loosening of the screws of the screws may be present.  Recommend CT and we can schedule it.  There are arthritic chagnes as well.  Proceed with PT.  Please schedule CT before follow up.

## 2023-09-27 NOTE — TELEPHONE ENCOUNTER
Alexus-I spoke with Mr. Barrera and gave him the results of the lumbar x-ray as per you. He scheduled a CT Scan for tomorrow. He is concerned about continuing with PT before you get the results of the CT Scan. I let him know you recommended that he go ahead with the PT. Please advise.

## 2023-09-28 ENCOUNTER — HOSPITAL ENCOUNTER (OUTPATIENT)
Dept: RADIOLOGY | Facility: HOSPITAL | Age: 53
Discharge: HOME | End: 2023-09-28
Attending: PHYSICIAN ASSISTANT
Payer: COMMERCIAL

## 2023-09-28 DIAGNOSIS — M54.50 LUMBAR BACK PAIN: ICD-10-CM

## 2023-09-28 DIAGNOSIS — Z98.1 HISTORY OF LUMBAR FUSION: ICD-10-CM

## 2023-09-28 DIAGNOSIS — G89.29 CHRONIC BILATERAL LOW BACK PAIN WITH BILATERAL SCIATICA: ICD-10-CM

## 2023-09-28 DIAGNOSIS — M54.41 CHRONIC BILATERAL LOW BACK PAIN WITH BILATERAL SCIATICA: ICD-10-CM

## 2023-09-28 DIAGNOSIS — M54.42 CHRONIC BILATERAL LOW BACK PAIN WITH BILATERAL SCIATICA: ICD-10-CM

## 2023-09-28 PROCEDURE — 72131 CT LUMBAR SPINE WITHOUT CONTRAST: ICD-10-PCS | Mod: 26,,, | Performed by: RADIOLOGY

## 2023-09-28 PROCEDURE — 72131 CT LUMBAR SPINE W/O DYE: CPT | Mod: TC,PO

## 2023-09-28 PROCEDURE — 72131 CT LUMBAR SPINE W/O DYE: CPT | Mod: 26,,, | Performed by: RADIOLOGY

## 2023-10-03 DIAGNOSIS — I20.89 EQUIVALENT ANGINA: Chronic | ICD-10-CM

## 2023-10-03 RX ORDER — NITROGLYCERIN 0.4 MG/1
TABLET SUBLINGUAL
Qty: 25 TABLET | Refills: 0 | Status: SHIPPED | OUTPATIENT
Start: 2023-10-03 | End: 2023-11-07

## 2023-10-06 ENCOUNTER — OFFICE VISIT (OUTPATIENT)
Dept: PAIN MEDICINE | Facility: CLINIC | Age: 53
End: 2023-10-06
Payer: COMMERCIAL

## 2023-10-06 VITALS
SYSTOLIC BLOOD PRESSURE: 121 MMHG | HEART RATE: 128 BPM | WEIGHT: 214.5 LBS | DIASTOLIC BLOOD PRESSURE: 83 MMHG | HEIGHT: 75 IN | BODY MASS INDEX: 26.67 KG/M2

## 2023-10-06 DIAGNOSIS — M54.50 LUMBAR BACK PAIN: ICD-10-CM

## 2023-10-06 DIAGNOSIS — M54.41 CHRONIC BILATERAL LOW BACK PAIN WITH BILATERAL SCIATICA: ICD-10-CM

## 2023-10-06 DIAGNOSIS — M47.816 LUMBAR SPONDYLOSIS: ICD-10-CM

## 2023-10-06 DIAGNOSIS — M54.42 CHRONIC BILATERAL LOW BACK PAIN WITH BILATERAL SCIATICA: ICD-10-CM

## 2023-10-06 DIAGNOSIS — Z98.1 HISTORY OF LUMBAR FUSION: Primary | ICD-10-CM

## 2023-10-06 DIAGNOSIS — G89.29 CHRONIC BILATERAL LOW BACK PAIN WITH BILATERAL SCIATICA: ICD-10-CM

## 2023-10-06 PROCEDURE — 99214 PR OFFICE/OUTPT VISIT, EST, LEVL IV, 30-39 MIN: ICD-10-PCS | Mod: S$GLB,,, | Performed by: PHYSICIAN ASSISTANT

## 2023-10-06 PROCEDURE — 99999 PR PBB SHADOW E&M-EST. PATIENT-LVL III: ICD-10-PCS | Mod: PBBFAC,,, | Performed by: PHYSICIAN ASSISTANT

## 2023-10-06 PROCEDURE — 99999 PR PBB SHADOW E&M-EST. PATIENT-LVL III: CPT | Mod: PBBFAC,,, | Performed by: PHYSICIAN ASSISTANT

## 2023-10-06 PROCEDURE — 99214 OFFICE O/P EST MOD 30 MIN: CPT | Mod: S$GLB,,, | Performed by: PHYSICIAN ASSISTANT

## 2023-10-06 NOTE — PROGRESS NOTES
[facility] Follow Up        PCP:  Keyla Ralph MD    CC:   Chief Complaint   Patient presents with    Follow-up     CT Scan results for lbp.          HPI:     Mr. Villatoro returns for follow up of back and leg pain.  He has had xrays and CT. Presents to discuss results.  He has started PT.  But due to GI virus he did not go this week.  Pain continues across the lwoer back with radiation around the waist and into the bilateral legs in a generalized distribution to the feet.  Pain eases with a hot shower and sleep.  He is taking gabapentin, and using marijuana for pain.     Initial HPI:  Shauna Barrera is a 52 y.o. year old male patient who has a past medical history of Anticoagulant long-term use, Arthritis, Coronary artery disease, Diabetes mellitus type I, Hypertension associated with diabetes, and PONV (postoperative nausea and vomiting). He presents in self referral for lower back and leg pain.   He had lumbar fracture in 2017 and lumbar surgery in 2018 L4/5 fusion.  He did well after surgery.  Current pain started in the beginning of 2023 while recovering from right toe amputation.  He was sedentary with recovery.  Continues to be sedentary during the day because of pain in the back.  Pain is felt across the lwoer back with radiation around the waist and into the bilateral legs in a generalized distribution to the feet.  Pain eases with a hot shower and sleep.  He is taking gabapentin, and using marijuana for pain.      Denies bowel/ bladder incontinence.    He has had cardiac stent placed 2 weeks ago. Continues to follow with cardiology.    Past and current medications:  Antineuropathics:  gabpaentin  NSAIDs:  Antidepressants:  Muscle relaxers:  Opioids:  Antiplatelets/Anticoagulants:  plavix, ASA  Others:  marijauana    Physical Therapy/ Chiropractic care:  none    Pain Intervention History:  none    Past Spine Surgical History:  none        History:    Current Outpatient Medications:      amLODIPine (NORVASC) 5 MG tablet, Take 5 mg by mouth every morning., Disp: , Rfl:     aspirin (ECOTRIN) 81 MG EC tablet, Take by mouth once daily., Disp: , Rfl:     BASAGLAR KWIKPEN U-100 INSULIN glargine 100 units/mL SubQ pen, ADMINISTER 20 UNITS UNDER THE SKIN EVERY 12 HOURS, Disp: , Rfl:     clopidogreL (PLAVIX) 75 mg tablet, Take 1 tablet (75 mg total) by mouth once daily., Disp: 30 tablet, Rfl: 11    gabapentin (NEURONTIN) 300 MG capsule, Take 300 mg by mouth 3 (three) times daily., Disp: , Rfl:     losartan-hydrochlorothiazide 100-25 mg (HYZAAR) 100-25 mg per tablet, Take 1 tablet by mouth every morning., Disp: , Rfl:     metoprolol succinate (TOPROL-XL) 25 MG 24 hr tablet, Take 1.5 tablets (37.5 mg total) by mouth once daily., Disp: 135 tablet, Rfl: 0    nitroGLYCERIN (NITROSTAT) 0.4 MG SL tablet, DISSOLVE ONE TABLET UNDER TONGUE AS NEEDED FOR CHEST PAIN EVERY 5 MINUTES, Disp: 25 tablet, Rfl: 0    rosuvastatin (CRESTOR) 20 MG tablet, Take 20 mg by mouth every evening., Disp: , Rfl:     tiZANidine (ZANAFLEX) 4 MG tablet, Take 1 tablet (4 mg total) by mouth nightly as needed (muscle spasms/ pain)., Disp: 40 tablet, Rfl: 0    TRULICITY 3 mg/0.5 mL pen injector, every 7 days. Saturday, Disp: , Rfl:     metFORMIN (GLUCOPHAGE) 1000 MG tablet, Take 1 tablet (1,000 mg total) by mouth 2 (two) times daily., Disp: 60 tablet, Rfl: 0    Past Medical History:   Diagnosis Date    Anticoagulant long-term use     Arthritis     Coronary artery disease     Diabetes mellitus type I     Hypertension associated with diabetes 07/28/2023    PONV (postoperative nausea and vomiting)        Past Surgical History:   Procedure Laterality Date    ANGIOGRAM, CORONARY, WITH LEFT HEART CATHETERIZATION  9/11/2023    Procedure: Left Heart Cath;  Surgeon: Crissy Tucker MD;  Location: [facility];  Service: Cardiology;;    BACK SURGERY  2018    FINGER AMPUTATION Right     index finger    FRACTIONAL FLOW RESERVE (FFR), CORONARY  9/11/2023     "Procedure: (IFR) RCA;  Surgeon: Crissy Tucker MD;  Location: [facility];  Service: Cardiology;;    KNEE ARTHROSCOPY Right     torn meniscus    PERCUTANEOUS CORONARY INTERVENTION, ARTERY  9/11/2023    Procedure: SAAD Diagonal;  Surgeon: Crissy Tucker MD;  Location: [facility];  Service: Cardiology;;    TOE AMPUTATION Right     great toe       Family History   Problem Relation Age of Onset    No Known Problems Mother     Diabetes Father     Alzheimer's disease Father        Social History     Socioeconomic History    Marital status: Significant Other   Tobacco Use    Smoking status: Every Day     Current packs/day: 0.50     Average packs/day: 0.5 packs/day for 45.0 years (22.5 ttl pk-yrs)     Types: Cigarettes    Smokeless tobacco: Never   Substance and Sexual Activity    Alcohol use: Not Currently    Drug use: Never    Sexual activity: Not Currently       Review of patient's allergies indicates:  No Known Allergies    Labs:  Lab Results   Component Value Date    HGBA1C 13.7 (H) 04/05/2022       Lab Results   Component Value Date    WBC 9.30 09/11/2023    HGB 15.1 09/11/2023    HCT 42.8 09/11/2023    MCV 87 09/11/2023     09/11/2023           Review of Systems:  Low back pain.  Leg pain..  Balance of review of systems is negative.    Physical Exam:  Vitals:    10/06/23 1258   BP: 121/83   Pulse: (!) 128   Weight: 97.3 kg (214 lb 8.1 oz)   Height: 6' 3" (1.905 m)   PainSc:   6   PainLoc: Back     Body mass index is 26.81 kg/m².    Gen: NAD  Psych: mood appropriate for given condition  HEENT: eyes anicteric   CV: RRR, 2+ radial pulse  HEENT: anicteric   Respiratory: non-labored, no signs of respiratory distress  Abd: non-distended  Skin: warm, dry and intact.  Gait: Able to heel walk, toe walk. Antalgic gait.     Coordination:   Tandem walking coordination: normal    Cervical spine: ROM is full in flexion, extension and lateral rotation without increased pain.  Spurling's maneuver causes no neck pain to either " side.  Myofascial exam: No Tenderness to palpation across cervical paraspinous region bilaterally.    Lumbar spine:  Lumbar spine: ROM is full with flexion extension and oblique extension with no increased pain.    Nawaf's test causes no increased pain on either side.    Supine straight leg raise is negative bilaterally.    Internal and external rotation of the hip causes no increased pain on either side.  Myofascial exam: No tenderness to palpation across lumbar paraspinous muscles. No tenderness to palpation over the bilateral greater trochanters and bilateral SI joint    Sensory:  Intact and symmetrical to light touch in C4-T1 dermatomes bilaterally. Intact and symmetrical to light touch in L1-S1 dermatomes bilaterally, excepte decreased at the toes consistent with diabetic neuropathy    Motor:    Right Left   C4 Shoulder Abduction  5  5   C5 Elbow Flexion    5  5   C6 Wrist Extension  5  5   C7 Elbow Extension   5  5   C8/T1 Hand Intrinsics   5  5        Right Left   L2/3 Iliacus Hip flexion  5  5   L3/4 Qudratus Femoris Knee Extension  5  5   L4/5 Tib Anterior Ankle Dorsiflexion   5  5   L5/S1 Extensor Hallicus Longus Great toe extension  5  5   S1/S2 Gastroc/Soleus Plantar Flexion  5  5      Right Left   Triceps DTR 1+ 1+   Biceps DTR 1+ 1+   Brachioradialis DTR 1+ 1+   Patellar DTR 1+ 1+   Achilles DTR 1+ 1+   Ruvalcaba Absent  Absent   Clonus Absent Absent   Babinski Absent Absent       Imaging:    Xray lumbar spine 9-25-23:  Posterior instrumented fusion and laminectomy changes at L5-S1.  Hardware demonstrates overall satisfactory alignment without evidence of fracture.  Suspected periprosthetic lucency surrounding the L5-S1 screws which could be seen in the setting of loosening although nonspecific.  Vertebral body heights are preserved.  No fractures or destructive changes.  Age-indeterminate L5 spondylolysis and grade 1 anterolisthesis of L5 on S1 as well as trace retrolisthesis of L2 on L3, L3 on L4,  and L4 on L5 without evidence of significant change between dynamic flexion and extension maneuvers.  Advanced disc height loss at L5-S1.    Ct lumbar [id number]:  Stable postoperative changes of posterior instrumented fusion at L5-S1 without evidence of acute hardware complication.  Attempted ghost screw tracts medial to the bilateral S1 screws may account for the perihardware lucency seen on prior radiographs.L3/4, L4/5 and L5/S1 facet arthropathy and disk/ oasteophyte with bialteral foraminal narrowing.  L3/4 central canal narrowing.       Assessment:   Shauna Barrera is a 52 y.o. year old male patient who has a past medical history of Anticoagulant long-term use, Arthritis, Coronary artery disease, Diabetes mellitus type I, Hypertension associated with diabetes, and PONV (postoperative nausea and vomiting). He presents in self referral for lower back pain and leg pain.  Lumbar radiculpathy due to L3/4 stenosis and degenerative changes with foraminal narrowing at L3/4, L4/5, L5/S1.  No evidence to support hardware loosening.    Problem List Items Addressed This Visit    None  Visit Diagnoses       History of lumbar fusion    -  Primary    Lumbar back pain        Chronic bilateral low back pain with bilateral sciatica        Lumbar spondylosis                  Plan:   - continue with PT.  - zanalfex to help with spasms.  - continue  - tylenol  - encoaurged movement and activity; being sedencatry will continue to exacerbate pain.      Follow Up: RTC in 8 weeks to monitor progress    : Reviewed and consistent with medication use as prescribed.        Alexus Diop PA-C  [facility]

## 2023-11-07 DIAGNOSIS — I20.89 EQUIVALENT ANGINA: Chronic | ICD-10-CM

## 2023-11-07 RX ORDER — NITROGLYCERIN 0.4 MG/1
TABLET SUBLINGUAL
Qty: 25 TABLET | Refills: 0 | Status: SHIPPED | OUTPATIENT
Start: 2023-11-07

## 2023-12-15 ENCOUNTER — OFFICE VISIT (OUTPATIENT)
Dept: PAIN MEDICINE | Facility: CLINIC | Age: 53
End: 2023-12-15
Payer: COMMERCIAL

## 2023-12-15 VITALS
HEART RATE: 109 BPM | DIASTOLIC BLOOD PRESSURE: 83 MMHG | SYSTOLIC BLOOD PRESSURE: 136 MMHG | BODY MASS INDEX: 27.35 KG/M2 | WEIGHT: 218.81 LBS

## 2023-12-15 DIAGNOSIS — G89.29 CHRONIC BILATERAL LOW BACK PAIN WITH BILATERAL SCIATICA: Primary | ICD-10-CM

## 2023-12-15 DIAGNOSIS — Z98.1 HISTORY OF LUMBAR FUSION: ICD-10-CM

## 2023-12-15 DIAGNOSIS — M54.41 CHRONIC BILATERAL LOW BACK PAIN WITH BILATERAL SCIATICA: Primary | ICD-10-CM

## 2023-12-15 DIAGNOSIS — M54.50 LUMBAR BACK PAIN: ICD-10-CM

## 2023-12-15 DIAGNOSIS — M47.816 LUMBAR SPONDYLOSIS: ICD-10-CM

## 2023-12-15 DIAGNOSIS — M54.42 CHRONIC BILATERAL LOW BACK PAIN WITH BILATERAL SCIATICA: Primary | ICD-10-CM

## 2023-12-15 PROCEDURE — 99999 PR PBB SHADOW E&M-EST. PATIENT-LVL III: ICD-10-PCS | Mod: PBBFAC,,, | Performed by: PHYSICIAN ASSISTANT

## 2023-12-15 PROCEDURE — 99213 OFFICE O/P EST LOW 20 MIN: CPT | Mod: S$GLB,,, | Performed by: PHYSICIAN ASSISTANT

## 2023-12-15 PROCEDURE — 99999 PR PBB SHADOW E&M-EST. PATIENT-LVL III: CPT | Mod: PBBFAC,,, | Performed by: PHYSICIAN ASSISTANT

## 2023-12-15 PROCEDURE — 99213 PR OFFICE/OUTPT VISIT, EST, LEVL III, 20-29 MIN: ICD-10-PCS | Mod: S$GLB,,, | Performed by: PHYSICIAN ASSISTANT

## 2023-12-17 ENCOUNTER — TELEPHONE (OUTPATIENT)
Dept: PAIN MEDICINE | Facility: CLINIC | Age: 53
End: 2023-12-17

## 2023-12-18 NOTE — PROGRESS NOTES
[facility]        PCP:  Keyla Ralph MD    CC:   Chief Complaint   Patient presents with    Back Pain          HPI:     Mr. Villatoro returns for follow up of back and leg pain.  Pain continues across the lower back with radiation around the waist and into the bilateral legs in a generalized distribution to the feet.  Pain eases with a hot shower and sleep.  He is taking gabapentin, and using marijuana for pain. He has tried PT with pain continuing.  He is ready to consider CONRAD if possible.  Last HgbA1c on record from [facility] in 2022 was 13.7.  He states he had a more recent one with external PCP that was 7.    Initial HPI:  Shauna Barrera is a 53 y.o. year old male patient who has a past medical history of Anticoagulant long-term use, Arthritis, Coronary artery disease, Diabetes mellitus type I, Hypertension associated with diabetes, and PONV (postoperative nausea and vomiting). He presents in self referral for lower back and leg pain.   He had lumbar fracture in 2017 and lumbar surgery in 2018 L4/5 fusion.  He did well after surgery.  Current pain started in the beginning of 2023 while recovering from right toe amputation.  He was sedentary with recovery.  Continues to be sedentary during the day because of pain in the back.  Pain is felt across the lwoer back with radiation around the waist and into the bilateral legs in a generalized distribution to the feet.  Pain eases with a hot shower and sleep.  He is taking gabapentin, and using marijuana for pain.      Denies bowel/ bladder incontinence.    He has had cardiac stent placed 2 weeks ago. Continues to follow with cardiology.    Past and current medications:  Antineuropathics:  gabpaentin  NSAIDs:  Antidepressants:  Muscle relaxers:  Opioids:  Antiplatelets/Anticoagulants:  plavix, ASA  Others:  marijauana    Physical Therapy/ Chiropractic care:  PT - tried.    Pain Intervention History:  none    Past Spine Surgical  History:  none        History:    Current Outpatient Medications:     amLODIPine (NORVASC) 5 MG tablet, Take 5 mg by mouth every morning., Disp: , Rfl:     aspirin (ECOTRIN) 81 MG EC tablet, Take by mouth once daily., Disp: , Rfl:     BASAGLAR KWIKPEN U-100 INSULIN glargine 100 units/mL SubQ pen, ADMINISTER 20 UNITS UNDER THE SKIN EVERY 12 HOURS, Disp: , Rfl:     clopidogreL (PLAVIX) 75 mg tablet, Take 1 tablet (75 mg total) by mouth once daily., Disp: 30 tablet, Rfl: 11    gabapentin (NEURONTIN) 300 MG capsule, Take 300 mg by mouth 3 (three) times daily., Disp: , Rfl:     losartan-hydrochlorothiazide 100-25 mg (HYZAAR) 100-25 mg per tablet, Take 1 tablet by mouth every morning., Disp: , Rfl:     metoprolol succinate (TOPROL-XL) 25 MG 24 hr tablet, Take 1.5 tablets (37.5 mg total) by mouth once daily., Disp: 135 tablet, Rfl: 0    nitroGLYCERIN (NITROSTAT) 0.4 MG SL tablet, DISSOLVE 1 TABLET UNDER THE TONGUE AS NEEDED FOR CHEST PAIN EVERY 5 MINUTES, Disp: 25 tablet, Rfl: 0    rosuvastatin (CRESTOR) 20 MG tablet, Take 20 mg by mouth every evening., Disp: , Rfl:     tiZANidine (ZANAFLEX) 4 MG tablet, Take 1 tablet (4 mg total) by mouth nightly as needed (muscle spasms/ pain)., Disp: 40 tablet, Rfl: 0    TRULICITY 3 mg/0.5 mL pen injector, every 7 days. Saturday, Disp: , Rfl:     metFORMIN (GLUCOPHAGE) 1000 MG tablet, Take 1 tablet (1,000 mg total) by mouth 2 (two) times daily., Disp: 60 tablet, Rfl: 0    Past Medical History:   Diagnosis Date    Anticoagulant long-term use     Arthritis     Coronary artery disease     Diabetes mellitus type I     Hypertension associated with diabetes 07/28/2023    PONV (postoperative nausea and vomiting)        Past Surgical History:   Procedure Laterality Date    ANGIOGRAM, CORONARY, WITH LEFT HEART CATHETERIZATION  9/11/2023    Procedure: Left Heart Cath;  Surgeon: Crissy Tucker MD;  Location: [facility] CATH;  Service: Cardiology;;    BACK SURGERY  2018    FINGER AMPUTATION Right      index finger    FRACTIONAL FLOW RESERVE (FFR), CORONARY  9/11/2023    Procedure: (IFR) RCA;  Surgeon: Crissy Tucker MD;  Location: [facility] CATH;  Service: Cardiology;;    KNEE ARTHROSCOPY Right     torn meniscus    PERCUTANEOUS CORONARY INTERVENTION, ARTERY  9/11/2023    Procedure: SAAD Diagonal;  Surgeon: Crissy Tucker MD;  Location: [facility] CATH;  Service: Cardiology;;    TOE AMPUTATION Right     great toe       Family History   Problem Relation Age of Onset    No Known Problems Mother     Diabetes Father     Alzheimer's disease Father        Social History     Socioeconomic History    Marital status: Significant Other   Tobacco Use    Smoking status: Every Day     Current packs/day: 0.50     Average packs/day: 0.5 packs/day for 45.0 years (22.5 ttl pk-yrs)     Types: Cigarettes    Smokeless tobacco: Never   Substance and Sexual Activity    Alcohol use: Not Currently    Drug use: Never    Sexual activity: Not Currently       Review of patient's allergies indicates:  No Known Allergies    Labs:  Lab Results   Component Value Date    HGBA1C 13.7 (H) 04/05/2022       Lab Results   Component Value Date    WBC 9.30 09/11/2023    HGB 15.1 09/11/2023    HCT 42.8 09/11/2023    MCV 87 09/11/2023     09/11/2023           Review of Systems:  Low back pain.  Leg pain..  Balance of review of systems is negative.    Physical Exam:  Vitals:    12/15/23 1544   BP: 136/83   Pulse: 109   Weight: 99.2 kg (218 lb 12.9 oz)   PainSc:   6   PainLoc: Back     Body mass index is 27.35 kg/m².    Gen: NAD  Psych: mood appropriate for given condition  HEENT: eyes anicteric   CV: RRR, 2+ radial pulse  HEENT: anicteric   Respiratory: non-labored, no signs of respiratory distress  Abd: non-distended  Skin: warm, dry and intact.  Gait: Able to heel walk, toe walk. Antalgic gait.     Coordination:   Tandem walking coordination: normal    Cervical spine: ROM is full in flexion, extension and lateral rotation without increased pain.  Spurling's  maneuver causes no neck pain to either side.  Myofascial exam: No Tenderness to palpation across cervical paraspinous region bilaterally.    Lumbar spine:  Lumbar spine: ROM is full with flexion extension and oblique extension with no increased pain.    Nawaf's test causes no increased pain on either side.    Supine straight leg raise is negative bilaterally.    Internal and external rotation of the hip causes no increased pain on either side.  Myofascial exam: No tenderness to palpation across lumbar paraspinous muscles. No tenderness to palpation over the bilateral greater trochanters and bilateral SI joint    Sensory:  Intact and symmetrical to light touch in C4-T1 dermatomes bilaterally. Intact and symmetrical to light touch in L1-S1 dermatomes bilaterally, excepte decreased at the toes consistent with diabetic neuropathy    Motor:    Right Left   C4 Shoulder Abduction  5  5   C5 Elbow Flexion    5  5   C6 Wrist Extension  5  5   C7 Elbow Extension   5  5   C8/T1 Hand Intrinsics   5  5        Right Left   L2/3 Iliacus Hip flexion  5  5   L3/4 Qudratus Femoris Knee Extension  5  5   L4/5 Tib Anterior Ankle Dorsiflexion   5  5   L5/S1 Extensor Hallicus Longus Great toe extension  5  5   S1/S2 Gastroc/Soleus Plantar Flexion  5  5      Right Left   Triceps DTR 1+ 1+   Biceps DTR 1+ 1+   Brachioradialis DTR 1+ 1+   Patellar DTR 1+ 1+   Achilles DTR 1+ 1+   Ruvalcaba Absent  Absent   Clonus Absent Absent   Babinski Absent Absent       Imaging:    Xray lumbar spine 9-25-23:  Posterior instrumented fusion and laminectomy changes at L5-S1.  Hardware demonstrates overall satisfactory alignment without evidence of fracture.  Suspected periprosthetic lucency surrounding the L5-S1 screws which could be seen in the setting of loosening although nonspecific.  Vertebral body heights are preserved.  No fractures or destructive changes.  Age-indeterminate L5 spondylolysis and grade 1 anterolisthesis of L5 on S1 as well as trace  retrolisthesis of L2 on L3, L3 on L4, and L4 on L5 without evidence of significant change between dynamic flexion and extension maneuvers.  Advanced disc height loss at L5-S1.    Ct lumbar wygsx1-11-16:  Stable postoperative changes of posterior instrumented fusion at L5-S1 without evidence of acute hardware complication.  Attempted ghost screw tracts medial to the bilateral S1 screws may account for the perihardware lucency seen on prior radiographs.L3/4, L4/5 and L5/S1 facet arthropathy and disk/ oasteophyte with bialteral foraminal narrowing.  L3/4 central canal narrowing.       Assessment:   Shauna Barrera is a 53 y.o. year old male patient who has a past medical history of Anticoagulant long-term use, Arthritis, Coronary artery disease, Diabetes mellitus type I, Hypertension associated with diabetes, and PONV (postoperative nausea and vomiting). He presents in self referral for lower back pain and leg pain.  Lumbar radiculpathy due to L3/4 stenosis and degenerative changes with foraminal narrowing at L3/4, L4/5, L5/S1.  No evidence to support hardware loosening.  Would like to consider CONRAD    Problem List Items Addressed This Visit    None  Visit Diagnoses       Chronic bilateral low back pain with bilateral sciatica    -  Primary    Relevant Orders    Procedure Order to Pain Management    HEMOGLOBIN A1C    History of lumbar fusion        Relevant Orders    Procedure Order to Pain Management    HEMOGLOBIN A1C    Lumbar back pain        Relevant Orders    Procedure Order to Pain Management    HEMOGLOBIN A1C    Lumbar spondylosis        Relevant Orders    Procedure Order to Pain Management    HEMOGLOBIN A1C              Plan:   - continue with PT.  - zanalfex to help with spasms.  - continue  - tylenol  - encoaurged movement and activity; being sedencatry will continue to exacerbate pain.  - Arrange for caudal CONRAD for lower back and leg pain.  Will need to obtain updated HgbA1c to confirm it is in appopriate  range.  Last one 13.7.  Will also need cardiac clearance to come off plavix given recent stent.  Will call after HgbA1c results are reviewed.       Follow Up: RTC as needed.      : Reviewed and consistent with medication use as prescribed.        Alexus Diop PA-C  [facility]

## 2023-12-22 ENCOUNTER — TELEPHONE (OUTPATIENT)
Dept: PAIN MEDICINE | Facility: CLINIC | Age: 53
End: 2023-12-22
Payer: COMMERCIAL

## 2023-12-22 NOTE — TELEPHONE ENCOUNTER
----- Message from Dana Johnson PA-C sent at 12/15/2023  4:03 PM CST -----  Regarding: Order for ZULEYMA TUCKER    Patient Name: ZULEYMA TUCKER([id number])  Sex: Male  : 1970      PCP: [facility], [facility]IOLET    Center: [facility]     Types of orders made on 12/15/2023: Procedure Request    Order Date:12/15/2023  Ordering User:DANA JOHNSON [[id number]]  Encounter Provider:Dana Johnson PA-C [[id number]]  Rae herring Provider: Dana Johnson PA-C [[id number]]  Supervising Provider:LORENA CUEVA [[id number]]  Type of Supervision:Supervision Required  Department:[facility] PAIN MANAGEMENT[[id number]]    Common Order Information  Procedure -> Epidural Injection (specify level) Cmt: Caudal CONRAD    Order Specific Information  Order: Procedure Order to Pain Management [Custom: [id number]]  Order #:          [id number]: 1 FUTURE      Priority: Routine  Class: Clinic Performed    Future Order Information      Expires on:12/15/2024            Expected by:12/15/2023                   Associated Diagnoses      Z98.1 History of lumbar fusion      M54.50 Lumbar back pain      M54.42, M54.41, G89.29 Chronic bilateral low back pain with bilateral       sciatica      M47.816 Lumbar spondylosis      Facility Name: -> [facility]           Priority: Routi  ne  Class: Clinic Performed    Future Order Information      Expires on:12/15/2024            Expected by:12/15/2023                   Associated Diagnoses      Z98.1 History of lumbar fusion      M54.50 Lumbar back pain      M54.42, M54.41, G89.29 Chronic bilateral low back pain with bilateral       sciatica      M47.816 Lumbar spondylosis      Procedure -> Epidural Injection (specify level) Cmt: Caudal CONRAD        Facil  ity Name: -> [facility]

## 2023-12-27 ENCOUNTER — TELEPHONE (OUTPATIENT)
Dept: PAIN MEDICINE | Facility: CLINIC | Age: 53
End: 2023-12-27
Payer: COMMERCIAL

## 2024-01-02 NOTE — TELEPHONE ENCOUNTER
I did let him know we would need clearance from his cardiology team during his office visit.    He is also diabetic with his last A1C very high a year ago.  He was supposed to get another HgbA1c or provide us with the results from his primary care's office of a more recent test, but he has not done either.      I will let you know if he ever reaches back out to us.

## 2024-01-02 NOTE — TELEPHONE ENCOUNTER
Unfortunately, since the patient had a recent stent, he will need to be on plavix and ASA for at least 6 months, more often a full year prior to being able to hold these for an injection.

## 2024-01-06 ENCOUNTER — LAB VISIT (OUTPATIENT)
Dept: LAB | Facility: HOSPITAL | Age: 54
End: 2024-01-06
Payer: COMMERCIAL

## 2024-01-06 DIAGNOSIS — Z79.02 LONG TERM (CURRENT) USE OF ANTITHROMBOTICS/ANTIPLATELETS: ICD-10-CM

## 2024-01-06 DIAGNOSIS — I25.10 CORONARY ARTERY DISEASE INVOLVING NATIVE CORONARY ARTERY OF NATIVE HEART WITHOUT ANGINA PECTORIS: Chronic | ICD-10-CM

## 2024-01-06 DIAGNOSIS — M54.41 CHRONIC BILATERAL LOW BACK PAIN WITH BILATERAL SCIATICA: ICD-10-CM

## 2024-01-06 DIAGNOSIS — Z98.1 HISTORY OF LUMBAR FUSION: ICD-10-CM

## 2024-01-06 DIAGNOSIS — M54.50 LUMBAR BACK PAIN: ICD-10-CM

## 2024-01-06 DIAGNOSIS — E11.59 HYPERTENSION ASSOCIATED WITH DIABETES: Chronic | ICD-10-CM

## 2024-01-06 DIAGNOSIS — M54.42 CHRONIC BILATERAL LOW BACK PAIN WITH BILATERAL SCIATICA: ICD-10-CM

## 2024-01-06 DIAGNOSIS — G89.29 CHRONIC BILATERAL LOW BACK PAIN WITH BILATERAL SCIATICA: ICD-10-CM

## 2024-01-06 DIAGNOSIS — M47.816 LUMBAR SPONDYLOSIS: ICD-10-CM

## 2024-01-06 DIAGNOSIS — I15.2 HYPERTENSION ASSOCIATED WITH DIABETES: Chronic | ICD-10-CM

## 2024-01-06 LAB
ALBUMIN SERPL BCP-MCNC: 4.1 G/DL (ref 3.5–5.2)
ALP SERPL-CCNC: 88 U/L (ref 55–135)
ALT SERPL W/O P-5'-P-CCNC: 27 U/L (ref 10–44)
ANION GAP SERPL CALC-SCNC: 12 MMOL/L (ref 8–16)
AST SERPL-CCNC: 13 U/L (ref 10–40)
BILIRUB SERPL-MCNC: 0.7 MG/DL (ref 0.1–1)
BUN SERPL-MCNC: 14 MG/DL (ref 6–20)
CALCIUM SERPL-MCNC: 9.4 MG/DL (ref 8.7–10.5)
CHLORIDE SERPL-SCNC: 98 MMOL/L (ref 95–110)
CHOLEST SERPL-MCNC: 128 MG/DL (ref 120–199)
CHOLEST/HDLC SERPL: 3.8 {RATIO} (ref 2–5)
CO2 SERPL-SCNC: 26 MMOL/L (ref 23–29)
CREAT SERPL-MCNC: 0.9 MG/DL (ref 0.5–1.4)
EST. GFR  (NO RACE VARIABLE): >60 ML/MIN/1.73 M^2
ESTIMATED AVG GLUCOSE: 249 MG/DL (ref 68–131)
GLUCOSE SERPL-MCNC: 305 MG/DL (ref 70–110)
HBA1C MFR BLD: 10.3 % (ref 4–5.6)
HDLC SERPL-MCNC: 34 MG/DL (ref 40–75)
HDLC SERPL: 26.6 % (ref 20–50)
HGB BLD-MCNC: 15.8 G/DL (ref 14–18)
LDLC SERPL CALC-MCNC: 76.8 MG/DL (ref 63–159)
NONHDLC SERPL-MCNC: 94 MG/DL
POTASSIUM SERPL-SCNC: 4.1 MMOL/L (ref 3.5–5.1)
PROT SERPL-MCNC: 7.1 G/DL (ref 6–8.4)
SODIUM SERPL-SCNC: 136 MMOL/L (ref 136–145)
TRIGL SERPL-MCNC: 86 MG/DL (ref 30–150)

## 2024-01-06 PROCEDURE — 80053 COMPREHEN METABOLIC PANEL: CPT | Performed by: INTERNAL MEDICINE

## 2024-01-06 PROCEDURE — 80061 LIPID PANEL: CPT | Performed by: INTERNAL MEDICINE

## 2024-01-06 PROCEDURE — 36415 COLL VENOUS BLD VENIPUNCTURE: CPT | Mod: PO | Performed by: INTERNAL MEDICINE

## 2024-01-06 PROCEDURE — 83036 HEMOGLOBIN GLYCOSYLATED A1C: CPT | Performed by: PHYSICIAN ASSISTANT

## 2024-01-06 PROCEDURE — 85018 HEMOGLOBIN: CPT | Performed by: INTERNAL MEDICINE

## 2024-01-09 ENCOUNTER — TELEPHONE (OUTPATIENT)
Dept: PAIN MEDICINE | Facility: CLINIC | Age: 54
End: 2024-01-09
Payer: COMMERCIAL

## 2024-01-09 NOTE — TELEPHONE ENCOUNTER
I called Mr. Barrera.  We discussed his HgbA1c of 10 and stent placment in September.  He is not an ideal candicated for caudal MARSHA at this time.  Once he is able to get his HgbA1c under 8 and he can safely come off of plavix after stent (typically 6 months) then we can puruse Marsha.  He voided understanding and will work with PT on lowering HgbA1c.  He will call when is ready for MARSHA.

## 2024-01-09 NOTE — TELEPHONE ENCOUNTER
----- Message from Lorena Jacobson MD sent at 2024  9:52 AM CST -----  Regarding: RE: Order for ZULEYMA TUCKER  That's fairly high. But also, didn't he have stent in September? Usually they have to be on ASA and Plavix for 6 months prior to being able to hold  ----- Message -----  From: Dana Johnson PA-C  Sent: 2024   9:43 AM CST  To: Lorena Jacobson MD  Subject: RE: Order for ZULEYMA TUCKER                 Good morning -     Current HgbA1c is 10.  I am assuming we want to hold on CONRAD until it can come down some more?    Dana    ----- Message -----  From: Lorena Jacobson MD  Sent: 2023   9:50 AM CST  To: Dana Johnson PA-C  Subject: RE: Order for ZULEYMA TUCKER                 He needs his HbA1C checked again before we consider a steroid injection. His last last A1C was over 13 but that was a year ago.  Sergio  ----- Message -----  From: Dana Johnson PA-C  Sent: 12/15/2023   4:03 PM CST  To: [facility] Pain Management Schedulers  Subject: Order for ZULEYMA TUCKER                       Patient Name: ZULEYMA TUCKER([id number])  Sex: Male  : 1970      PCP: [facility], [facility]IOLET    Center: [facility]     Types of orders made on 12/15/2023: Procedure Request    Order Date:12/15/2023  Ordering User:DANA JOHNSON [id number]]  Encounter Provider:Dana Johnson PA-C [[id number]]  i  nevaeh Provider: Dana Johnson PA-C [[id number]]  Supervising Provider:LORENA JACOBSON [[id number]]  Type of Supervision:Supervision Required  Department:[facility] PAIN MANAGEMENT[[id number]]    Common Order Information  Procedure -> Epidural Injection (specify level) Cmt: Caudal CONRAD    Order Specific Information  Order: Procedure Order to Pain Management [Custom: [id number]]  Order #:          [id number]: 1 FUTURE      Priority: Routine  Class: Clinic Performed    Future Order Information      Expires on:12/15/2024            Expected by:12/15/2023                   Associated Diagnoses       Z98.1 History of lumbar fusion      M54.50 Lumbar back pain      M54.42, M54.41, G89.29 Chronic bilateral low back pain with bilateral       sciatica      M47.816 Lumbar spondylosis      Facility Name: -> [facility]           Priority: Lediy cook  Class: Clinic Performed    Future Order Information      Expires on:12/15/2024            Expected by:12/15/2023                   Associated Diagnoses      Z98.1 History of lumbar fusion      M54.50 Lumbar back pain      M54.42, M54.41, G89.29 Chronic bilateral low back pain with bilateral       sciatica      M47.816 Lumbar spondylosis      Procedure -> Epidural Injection (specify level) Cmt: Caudal CONRAD        Facil  ity Name: -> [facility]

## 2024-07-09 ENCOUNTER — OFFICE VISIT (OUTPATIENT)
Dept: ORTHOPEDICS | Facility: CLINIC | Age: 54
End: 2024-07-09
Payer: COMMERCIAL

## 2024-07-09 VITALS — BODY MASS INDEX: 27 KG/M2 | WEIGHT: 216.06 LBS

## 2024-07-09 DIAGNOSIS — M43.10 ISTHMIC SPONDYLOLISTHESIS: ICD-10-CM

## 2024-07-09 DIAGNOSIS — Z98.1 HISTORY OF LUMBAR FUSION: Primary | ICD-10-CM

## 2024-07-09 DIAGNOSIS — M54.9 DORSALGIA, UNSPECIFIED: ICD-10-CM

## 2024-07-09 DIAGNOSIS — M47.816 LUMBAR SPONDYLOSIS: ICD-10-CM

## 2024-07-09 DIAGNOSIS — M51.36 DDD (DEGENERATIVE DISC DISEASE), LUMBAR: ICD-10-CM

## 2024-07-09 DIAGNOSIS — M43.06 LUMBAR SPONDYLOLYSIS: ICD-10-CM

## 2024-07-09 DIAGNOSIS — M54.16 LUMBAR RADICULOPATHY: ICD-10-CM

## 2024-07-09 PROCEDURE — 99204 OFFICE O/P NEW MOD 45 MIN: CPT | Mod: S$GLB,,, | Performed by: ORTHOPAEDIC SURGERY

## 2024-07-09 PROCEDURE — 1160F RVW MEDS BY RX/DR IN RCRD: CPT | Mod: CPTII,S$GLB,, | Performed by: ORTHOPAEDIC SURGERY

## 2024-07-09 PROCEDURE — 1159F MED LIST DOCD IN RCRD: CPT | Mod: CPTII,S$GLB,, | Performed by: ORTHOPAEDIC SURGERY

## 2024-07-09 PROCEDURE — 3008F BODY MASS INDEX DOCD: CPT | Mod: CPTII,S$GLB,, | Performed by: ORTHOPAEDIC SURGERY

## 2024-07-09 PROCEDURE — 99999 PR PBB SHADOW E&M-EST. PATIENT-LVL III: CPT | Mod: PBBFAC,,, | Performed by: ORTHOPAEDIC SURGERY

## 2024-07-09 PROCEDURE — 3046F HEMOGLOBIN A1C LEVEL >9.0%: CPT | Mod: CPTII,S$GLB,, | Performed by: ORTHOPAEDIC SURGERY

## 2024-07-09 RX ORDER — METHOCARBAMOL 500 MG/1
500 TABLET, FILM COATED ORAL 3 TIMES DAILY
Qty: 60 TABLET | Refills: 1 | Status: SHIPPED | OUTPATIENT
Start: 2024-07-09

## 2024-07-09 NOTE — PROGRESS NOTES
DATE: 7/9/2024  PATIENT: Shauna Barrera    Attending Physician: Derek Rodgers M.D.    CHIEF COMPLAINT: Lbo and BLE pain    HISTORY:  Shauna Barrera is a 53 y.o. male w/ a hx of L5-S1 PLDF (c/b DVT/PE) in [place] in 2018, and cardiac stents presents for initial evaluation of low back and b/l leg pain (Back - 7, Leg - 7). The pain has been present for 7 years. The patient describes the pain as dull and it radiates down BLE to the toes.  The pain is worse with activity and improved by rest. There is BLE associated numbness and tingling. There is BLE subjective weakness. Prior treatments have included meds (gabapentin), PT, CONRAD, but no recent surgery.    The Patient denies myelopathic symptoms such as handwriting changes or difficulty with buttons/coins/keys. Denies perineal paresthesias, bowel/bladder dysfunction.    The patient smokes 2ppd for 40 years. He has DM (HA1C of 10.3). He does not endorse IVDU. The patient is not on any blood thinners (used to take Plavix for DVT/PE). He is disabled.    PAST MEDICAL/SURGICAL HISTORY:  Past Medical History:   Diagnosis Date    Anticoagulant long-term use     Arthritis     Coronary artery disease     Diabetes mellitus type I     Hypertension associated with diabetes 07/28/2023    PONV (postoperative nausea and vomiting)      Past Surgical History:   Procedure Laterality Date    ANGIOGRAM, CORONARY, WITH LEFT HEART CATHETERIZATION  9/11/2023    Procedure: Left Heart Cath;  Surgeon: Crissy Tucker MD;  Location: [facility] CATH;  Service: Cardiology;;    BACK SURGERY  2018    FINGER AMPUTATION Right     index finger    FRACTIONAL FLOW RESERVE (FFR), CORONARY  9/11/2023    Procedure: (IFR) RCA;  Surgeon: Crissy Tucker MD;  Location: [facility] CATH;  Service: Cardiology;;    KNEE ARTHROSCOPY Right     torn meniscus    PERCUTANEOUS CORONARY INTERVENTION, ARTERY  9/11/2023    Procedure: SAAD Diagonal;  Surgeon: Crissy Tucker MD;  Location: [facility] CATH;  Service: Cardiology;;    TOE  AMPUTATION Right     great toe       Current Medications:   Current Outpatient Medications:     amLODIPine (NORVASC) 5 MG tablet, Take 5 mg by mouth every morning., Disp: , Rfl:     aspirin (ECOTRIN) 81 MG EC tablet, Take by mouth once daily., Disp: , Rfl:     BASAGLAR KWIKPEN U-100 INSULIN glargine 100 units/mL SubQ pen, ADMINISTER 20 UNITS UNDER THE SKIN EVERY 12 HOURS, Disp: , Rfl:     clopidogreL (PLAVIX) 75 mg tablet, Take 1 tablet (75 mg total) by mouth once daily., Disp: 30 tablet, Rfl: 11    gabapentin (NEURONTIN) 300 MG capsule, Take 300 mg by mouth 3 (three) times daily., Disp: , Rfl:     losartan-hydrochlorothiazide 100-25 mg (HYZAAR) 100-25 mg per tablet, Take 1 tablet by mouth every morning., Disp: , Rfl:     metFORMIN (GLUCOPHAGE) 1000 MG tablet, Take 1 tablet (1,000 mg total) by mouth 2 (two) times daily., Disp: 60 tablet, Rfl: 0    methocarbamoL (ROBAXIN) 500 MG Tab, Take 1 tablet (500 mg total) by mouth 3 (three) times daily., Disp: 60 tablet, Rfl: 1    metoprolol succinate (TOPROL-XL) 25 MG 24 hr tablet, Take 1.5 tablets (37.5 mg total) by mouth once daily., Disp: 135 tablet, Rfl: 0    nitroGLYCERIN (NITROSTAT) 0.4 MG SL tablet, DISSOLVE 1 TABLET UNDER THE TONGUE AS NEEDED FOR CHEST PAIN EVERY 5 MINUTES, Disp: 25 tablet, Rfl: 0    rosuvastatin (CRESTOR) 20 MG tablet, Take 20 mg by mouth every evening., Disp: , Rfl:     tiZANidine (ZANAFLEX) 4 MG tablet, Take 1 tablet (4 mg total) by mouth nightly as needed (muscle spasms/ pain)., Disp: 40 tablet, Rfl: 0    TRULICITY 3 mg/0.5 mL pen injector, every 7 days. Saturday, Disp: , Rfl:     Social History:   Social History     Socioeconomic History    Marital status: Significant Other   Tobacco Use    Smoking status: Every Day     Current packs/day: 0.50     Average packs/day: 0.5 packs/day for 45.0 years (22.5 ttl pk-yrs)     Types: Cigarettes    Smokeless tobacco: Never   Substance and Sexual Activity    Alcohol use: Not Currently    Drug use: Never     Sexual activity: Not Currently       REVIEW OF SYSTEMS:  Constitution: Negative. Negative for chills, fever and night sweats.   Cardiovascular: Negative for chest pain and syncope.   Respiratory: Negative for cough and shortness of breath.   Gastrointestinal: See HPI. Negative for nausea/vomiting. Negative for abdominal pain.  Genitourinary: See HPI. Negative for discoloration or dysuria.  Hematologic/Lymphatic: negative for bleeding/clotting disorders.   Musculoskeletal: Negative for falls and muscle weakness.   Neurological: See HPI. no history of seizures. no history of cranial surgery or shunts.  Neurological: See HPI. No seizures.   Endocrine: Negative for polydipsia, polyphagia and polyuria.   Allergic/Immunologic: Negative for hives and persistent infections.     EXAM:  Wt 98 kg (216 lb 0.8 oz)   BMI 27.00 kg/m²     PHYSICAL EXAMINATION:    General: The patient is a 53 y.o. male in no apparent distress, the patient is orientatied to person, place and time.  Psych: Normal mood and affect  HEENT: Vision grossly intact, hearing intact to the spoken word.  Lungs: Respirations unlabored.  Gait: Normal station and gait, no difficulty with toe or heel walk.   Skin: Dorsal lumbar skin negative for rashes, lesions, hairy patches. Posterior lumbar scar. There is lower lumbar tenderness to palpation.  Range of motion: Lumbar range of motion is acceptable.  Spinal Balance: Global saggital and coronal spinal balance acceptable, no significant for scoliosis and kyphosis.  Musculoskeletal: No pain with the range of motion of the bilateral hips. No trochanteric tenderness to palpation.  Vascular: Bilateral lower extremities warm and well perfused, Dorsalis pedis pulses 2+ bilaterally.  Neurological: Normal strength and tone in all major motor groups in the bilateral lower extremities. Normal sensation to light touch in the L2-S1 dermatomes bilaterally.  Deep tendon reflexes symmetric 2+ in the bilateral lower  extremities.  Negative Babinski bilaterally. Straight leg raise negative bilaterally.    IMAGING:   Today I independently reviewed the following images and my interpretations are as follows:    AP, Lat and Flex/Ex  upright L-spine demonstrate spondylosis and DDD. L5-S1 PLDF. L5-S1 anterolisthesis.    CT lumbar showed intact hardware. There is some PL fusion.      Body mass index is 27 kg/m².  Hemoglobin A1C   Date Value Ref Range Status   01/06/2024 10.3 (H) 4.0 - 5.6 % Final     Comment:     ADA Screening Guidelines:  5.7-6.4%  Consistent with prediabetes  >or=6.5%  Consistent with diabetes    High levels of fetal hemoglobin interfere with the HbA1C  assay. Heterozygous hemoglobin variants (HbS, HgC, etc)do  not significantly interfere with this assay.   However, presence of multiple variants may affect accuracy.     04/05/2022 13.7 (H) 0.0 - 5.6 % Final     Comment:     Reference Interval:  5.0 - 5.6 Normal   5.7 - 6.4 High Risk   > 6.5 Diabetic      Hgb A1c results are standardized based on the (NGSP) National   Glycohemoglobin Standardization Program.      Hemoglobin A1C levels are related to mean serum/plasma glucose   during the preceding 2-3 months.            ASSESSMENT/PLAN:    Shauna was seen today for pain.    Diagnoses and all orders for this visit:    History of lumbar fusion    Lumbar spondylosis  -     methocarbamoL (ROBAXIN) 500 MG Tab; Take 1 tablet (500 mg total) by mouth 3 (three) times daily.    DDD (degenerative disc disease), lumbar    Lumbar spondylolysis    Isthmic spondylolisthesis    Lumbar radiculopathy    Dorsalgia, unspecified  -     MRI Lumbar Spine Without Contrast; Future      Follow up in about 2 weeks (around 7/23/2024).    Patient has lumbar spondylosis and BLE radiculopathy, in the setting of prior L5-S1 PLDF for isthmic spondylolisthesis. I discussed the natural history of their diagnoses as well as surgical and nonsurgical treatment options. I educated the patient on the  importance of core/back strengthening, correct posture, bending/lifting ergonomics, and low-impact aerobic exercises (walking, elliptical, and aquatherapy). I prescribed robaxin. Continue medications. I ordered lumbar MRI to evaluate stenosis. Patient will follow up in 2 weeks for MRI review.    Derek Rodgers MD  Orthopaedic Spine Surgeon  Department of Orthopaedic Surgery  [phone]

## 2024-07-22 ENCOUNTER — PATIENT MESSAGE (OUTPATIENT)
Dept: RADIOLOGY | Facility: HOSPITAL | Age: 54
End: 2024-07-22
Payer: COMMERCIAL

## 2024-07-23 ENCOUNTER — HOSPITAL ENCOUNTER (OUTPATIENT)
Dept: RADIOLOGY | Facility: HOSPITAL | Age: 54
Discharge: HOME | End: 2024-07-23
Attending: ORTHOPAEDIC SURGERY
Payer: COMMERCIAL

## 2024-07-23 DIAGNOSIS — M54.9 DORSALGIA, UNSPECIFIED: ICD-10-CM

## 2024-07-23 PROCEDURE — 72148 MRI LUMBAR SPINE W/O DYE: CPT | Mod: 26,,, | Performed by: RADIOLOGY

## 2024-07-23 PROCEDURE — 72148 MRI LUMBAR SPINE W/O DYE: CPT | Mod: TC,PO

## 2024-08-26 ENCOUNTER — OFFICE VISIT (OUTPATIENT)
Dept: ORTHOPEDICS | Facility: CLINIC | Age: 54
End: 2024-08-26
Attending: ORTHOPAEDIC SURGERY
Payer: COMMERCIAL

## 2024-08-26 VITALS — HEIGHT: 75 IN | WEIGHT: 222.88 LBS | BODY MASS INDEX: 27.71 KG/M2

## 2024-08-26 DIAGNOSIS — M43.06 LUMBAR SPONDYLOLYSIS: ICD-10-CM

## 2024-08-26 DIAGNOSIS — M51.36 DDD (DEGENERATIVE DISC DISEASE), LUMBAR: ICD-10-CM

## 2024-08-26 DIAGNOSIS — M47.816 LUMBAR SPONDYLOSIS: ICD-10-CM

## 2024-08-26 DIAGNOSIS — M43.10 ISTHMIC SPONDYLOLISTHESIS: ICD-10-CM

## 2024-08-26 DIAGNOSIS — M54.16 LUMBAR RADICULOPATHY: ICD-10-CM

## 2024-08-26 DIAGNOSIS — Z98.1 HISTORY OF LUMBAR FUSION: Primary | ICD-10-CM

## 2024-08-26 PROCEDURE — 99999 PR PBB SHADOW E&M-EST. PATIENT-LVL IV: CPT | Mod: PBBFAC,,, | Performed by: ORTHOPAEDIC SURGERY

## 2024-08-26 PROCEDURE — 1159F MED LIST DOCD IN RCRD: CPT | Mod: CPTII,S$GLB,, | Performed by: ORTHOPAEDIC SURGERY

## 2024-08-26 PROCEDURE — 99214 OFFICE O/P EST MOD 30 MIN: CPT | Mod: S$GLB,,, | Performed by: ORTHOPAEDIC SURGERY

## 2024-08-26 PROCEDURE — 3046F HEMOGLOBIN A1C LEVEL >9.0%: CPT | Mod: CPTII,S$GLB,, | Performed by: ORTHOPAEDIC SURGERY

## 2024-08-26 PROCEDURE — 1160F RVW MEDS BY RX/DR IN RCRD: CPT | Mod: CPTII,S$GLB,, | Performed by: ORTHOPAEDIC SURGERY

## 2024-08-26 PROCEDURE — 3008F BODY MASS INDEX DOCD: CPT | Mod: CPTII,S$GLB,, | Performed by: ORTHOPAEDIC SURGERY

## 2024-08-26 NOTE — PROGRESS NOTES
"DATE: 8/27/2024  PATIENT: Shauna Barrera    Attending Physician: Derek Rodgers M.D.    HISTORY:  Shauna Barrera is a 53 y.o. male w/ a hx of L5-S1 PLDF (c/b DVT/PE) in [place] in 2018, and cardiac stents who returns to me today for MRI review. He continues to have LBP that radiates down BLE to the toes. He has been taking meds without much relief. He would like to continue conservative management.    The patient smokes 2ppd for 40 years. He has DM (HA1C of 10.3). He does not endorse IVDU. The patient is not on any blood thinners (used to take Plavix for DVT/PE). He is disabled.    PMH/PSH/FamHx/SocHx:  Unchanged from prior visit    ROS:  Positive for Lbp and BLE pain  Denies perineal paresthesias, bowel or bladder incontinence    EXAM:  Ht 6' 3" (1.905 m)   Wt 101.1 kg (222 lb 14.2 oz)   BMI 27.86 kg/m²     My physical examination was notable for the following findings: motor intact BLE; SILT    IMAGING:  Today I independently reviewed the following images and my interpretations are as follows:    Previous L-spine XRs showed spondylosis and DDD. L5-S1 PLDF. L5-S1 anterolisthesis.    CT lumbar showed intact hardware. There is some PL fusion.      Lumbar MRI showed no significant central stenosis. L4-S1 b/l mod-severe foraminal stenosis.    ASSESSMENT/PLAN:  Patient has lumbar spondylosis and stenosis with BLE radiculopathy, in the setting of prior L5-S1 PLDF for isthmic spondylolisthesis. I discussed the natural history of their diagnoses as well as surgical and nonsurgical treatment options. I educated the patient on the importance of core/back strengthening, correct posture, bending/lifting ergonomics, and low-impact aerobic exercises (walking, elliptical, and aquatherapy). Continue medications.  I referred patient to pain management for possible injections. I set him up with a PCP for DM mgmt. Patient will RTC PRN.    I provided the patient with a home exercise program. It is the OS spine conditioning " program. Exercises include head rolls, kneeling back extension, sitting rotation stretch, modified seated side straddle, knee to chest, bird dog, plank, modified seated plank, hip bridges, abdominal bracing, and abdominal crunch. Pt will complete each exercise 5 times daily for 6-8 weeks.    I have personally examined the patient and agree with the above plan.    Derek Rodgers MD  Orthopaedic Spine Surgeon  Department of Orthopaedic Surgery  [phone]

## 2024-08-27 ENCOUNTER — TELEPHONE (OUTPATIENT)
Dept: PAIN MEDICINE | Facility: CLINIC | Age: 54
End: 2024-08-27
Payer: COMMERCIAL

## 2024-08-27 ENCOUNTER — LAB VISIT (OUTPATIENT)
Dept: LAB | Facility: HOSPITAL | Age: 54
End: 2024-08-27
Attending: NURSE PRACTITIONER
Payer: COMMERCIAL

## 2024-08-27 ENCOUNTER — OFFICE VISIT (OUTPATIENT)
Dept: PAIN MEDICINE | Facility: CLINIC | Age: 54
End: 2024-08-27
Payer: COMMERCIAL

## 2024-08-27 VITALS
HEART RATE: 106 BPM | HEIGHT: 75 IN | BODY MASS INDEX: 27.94 KG/M2 | WEIGHT: 224.75 LBS | DIASTOLIC BLOOD PRESSURE: 96 MMHG | SYSTOLIC BLOOD PRESSURE: 149 MMHG

## 2024-08-27 DIAGNOSIS — M51.36 DDD (DEGENERATIVE DISC DISEASE), LUMBAR: ICD-10-CM

## 2024-08-27 DIAGNOSIS — M48.062 SPINAL STENOSIS OF LUMBAR REGION WITH NEUROGENIC CLAUDICATION: ICD-10-CM

## 2024-08-27 DIAGNOSIS — Z00.00 WELLNESS EXAMINATION: ICD-10-CM

## 2024-08-27 DIAGNOSIS — M54.16 LUMBAR RADICULOPATHY: Primary | ICD-10-CM

## 2024-08-27 DIAGNOSIS — Z00.00 INITIAL MEDICARE ANNUAL WELLNESS VISIT: ICD-10-CM

## 2024-08-27 LAB
ALBUMIN SERPL BCP-MCNC: 3.9 G/DL (ref 3.5–5.2)
ALP SERPL-CCNC: 92 U/L (ref 55–135)
ALT SERPL W/O P-5'-P-CCNC: 19 U/L (ref 10–44)
ANION GAP SERPL CALC-SCNC: 9 MMOL/L (ref 8–16)
AST SERPL-CCNC: 14 U/L (ref 10–40)
BILIRUB SERPL-MCNC: 0.6 MG/DL (ref 0.1–1)
BUN SERPL-MCNC: 10 MG/DL (ref 6–20)
CALCIUM SERPL-MCNC: 10.4 MG/DL (ref 8.7–10.5)
CHLORIDE SERPL-SCNC: 105 MMOL/L (ref 95–110)
CO2 SERPL-SCNC: 25 MMOL/L (ref 23–29)
CREAT SERPL-MCNC: 1 MG/DL (ref 0.5–1.4)
EST. GFR  (NO RACE VARIABLE): >60 ML/MIN/1.73 M^2
GLUCOSE SERPL-MCNC: 254 MG/DL (ref 70–110)
POTASSIUM SERPL-SCNC: 3.4 MMOL/L (ref 3.5–5.1)
PROT SERPL-MCNC: 7.4 G/DL (ref 6–8.4)
SODIUM SERPL-SCNC: 139 MMOL/L (ref 136–145)

## 2024-08-27 PROCEDURE — 36415 COLL VENOUS BLD VENIPUNCTURE: CPT | Performed by: NURSE PRACTITIONER

## 2024-08-27 PROCEDURE — 99999 PR PBB SHADOW E&M-EST. PATIENT-LVL IV: CPT | Mod: PBBFAC,,, | Performed by: NURSE PRACTITIONER

## 2024-08-27 PROCEDURE — 80053 COMPREHEN METABOLIC PANEL: CPT | Performed by: NURSE PRACTITIONER

## 2024-08-27 RX ORDER — PREGABALIN 150 MG/1
150 CAPSULE ORAL DAILY
Qty: 30 CAPSULE | Refills: 0 | Status: SHIPPED | OUTPATIENT
Start: 2024-08-27 | End: 2024-09-26

## 2024-08-27 NOTE — PROGRESS NOTES
[facility] - Established New  Patient Evaluation    Referred by: Dr. Derek Rodgers   Reason for referral: Lumbar radiculopathy     CC:   Chief Complaint   Patient presents with    Low-back Pain    Leg Pain         8/27/2024     9:01 AM 12/15/2023     3:47 PM 12/15/2023     3:45 PM   Last 3 PDI Scores   Pain Disability Index (PDI) 51 50 48       Subjective 08/27/2024:   Shauna Barrera is a 53 y.o. male who presents complaining of LBP that radiates to his BLE and toes.  He has a hx of L5-S1 PLDF (c/b DVT/PE) in [place] in 2018, and cardiac stents Patient was referred to our office per Dr. Rodgers for consideration for a Bilateral L4-5TFESI.  Patient smokes 2ppd for 40 years. He has DM (HA1C of 10.3). He does not endorse IVDU. The patient is not on any blood thinners (used to take Plavix for DVT/PE). He is disabled.     Initial Pain Assessment:  Location: low back pain    Onset: 6 months   Current Pain Score: 5/10  Daily Pain of Range: 5-8/10  Quality: Aching, Numb, Sharp, and Electric  Radiation: down bilateral legs   Worsened by: exercise, lifting, lying down, sitting, standing for more than several minutes, walking for more than several minutes, and Daily Activity   Improved by: nothing     Patient denies night fever/night sweats, urinary incontinence, bowel incontinence, significant weight loss, significant motor weakness, and loss of sensations.      Previous Interventions:  - n/a    Previous Therapies:  PT/OT: yes has not helped in the past 3 months ago   Chiropractor:   HEP:   Relevant Surgery: yes 2018    Previous Medications:   - Tylenol or NSAIDS: Aspirin   - Muscle Relaxants: Robaxin, Zanaflex   - TCAs:   - SNRIs:   - Topicals:   - Anticonvulsants: Gabapentin   - Opioids:   - Adjuvants: Gabapentin, Robaxin, Zanaflex     Current Pain Medications:  Robaxin 500 mg TID      Review of Systems:  Review of Systems   Musculoskeletal:  Positive for back pain.       GENERAL:  No weight loss,  malaise or fevers.  HEENT:   No recent changes in vision or hearing  NECK:  No difficulty with swallowing. No stridor.   RESPIRATORY:  Negative for cough, wheezing or shortness of breath, patient denies any recent URI.  CARDIOVASCULAR:  Negative for chest pain, leg swelling or palpitations.  GI:  Negative for abdominal discomfort, blood in stools or black stools or change in bowel habits.  MUSCULOSKELETAL:  See HPI.  SKIN:  Negative for lesions, rash, and itching.  PSYCH:  No mood disorder or recent psychosocial stressors.    HEMATOLOGY/LYMPHOLOGY:  Negative for prolonged bleeding, bruising easily or swollen nodes.  Patient is not currently taking any anti-coagulants  NEURO:   No history of headaches, syncope, paralysis, seizures or tremors.  All other reviewed and negative other than HPI.    History:  Current medications, allergies, medical history, surgical history,   family history, and social history were reviewed in the chart as marked.    Full Medication List:    Current Outpatient Medications:     amLODIPine (NORVASC) 5 MG tablet, Take 5 mg by mouth every morning., Disp: , Rfl:     aspirin (ECOTRIN) 81 MG EC tablet, Take by mouth once daily., Disp: , Rfl:     BASAGLAR KWIKPEN U-100 INSULIN glargine 100 units/mL SubQ pen, ADMINISTER 20 UNITS UNDER THE SKIN EVERY 12 HOURS, Disp: , Rfl:     clopidogreL (PLAVIX) 75 mg tablet, Take 1 tablet (75 mg total) by mouth once daily., Disp: 30 tablet, Rfl: 11    gabapentin (NEURONTIN) 300 MG capsule, Take 300 mg by mouth 3 (three) times daily., Disp: , Rfl:     losartan-hydrochlorothiazide 100-25 mg (HYZAAR) 100-25 mg per tablet, Take 1 tablet by mouth every morning., Disp: , Rfl:     metFORMIN (GLUCOPHAGE) 1000 MG tablet, Take 1 tablet (1,000 mg total) by mouth 2 (two) times daily., Disp: 60 tablet, Rfl: 0    methocarbamoL (ROBAXIN) 500 MG Tab, Take 1 tablet (500 mg total) by mouth 3 (three) times daily., Disp: 60 tablet, Rfl: 1    metoprolol succinate (TOPROL-XL) 25 MG  "24 hr tablet, Take 1.5 tablets (37.5 mg total) by mouth once daily., Disp: 135 tablet, Rfl: 0    nitroGLYCERIN (NITROSTAT) 0.4 MG SL tablet, DISSOLVE 1 TABLET UNDER THE TONGUE AS NEEDED FOR CHEST PAIN EVERY 5 MINUTES, Disp: 25 tablet, Rfl: 0    rosuvastatin (CRESTOR) 20 MG tablet, Take 20 mg by mouth every evening., Disp: , Rfl:     tiZANidine (ZANAFLEX) 4 MG tablet, Take 1 tablet (4 mg total) by mouth nightly as needed (muscle spasms/ pain)., Disp: 40 tablet, Rfl: 0    TRULICITY 3 mg/0.5 mL pen injector, every 7 days. Saturday, Disp: , Rfl:      Allergies:  Patient has no known allergies.     Medical History:   has a past medical history of Anticoagulant long-term use, Arthritis, Coronary artery disease, Diabetes mellitus type I, Hypertension associated with diabetes (07/28/2023), and PONV (postoperative nausea and vomiting).    Surgical History:   has a past surgical history that includes Back surgery (2018); Finger amputation (Right); Toe amputation (Right); Knee arthroscopy (Right); ANGIOGRAM, CORONARY, WITH LEFT HEART CATHETERIZATION (9/11/2023); fractional flow reserve (ffr), coronary (9/11/2023); and percutaneous coronary intervention, artery (9/11/2023).    Family History:  family history includes Alzheimer's disease in his father; Diabetes in his father; No Known Problems in his mother.    Social History:   reports that he has been smoking cigarettes. He has a 22.5 pack-year smoking history. He has never used smokeless tobacco. He reports that he does not currently use alcohol. He reports that he does not use drugs.    Physical Exam:  Vitals:    08/27/24 0911   BP: (!) 149/96   Pulse: 106   Weight: 101.9 kg (224 lb 12.1 oz)   Height: 6' 3" (1.905 m)   PainSc:   5   PainLoc: Back         General Musculoskeletal Exam   Gait: antalgic     Back (L-Spine & T-Spine) / Neck (C-Spine) Exam     Tenderness Posterior midline palpation reveals tenderness of the Lower L-Spine and Sacrum. Right paramedian tenderness of " the Lower L-Spine and Upper L-Spine. Left paramedian tenderness of the Lower L-Spine and Upper L-Spine.     Back (L-Spine & T-Spine) Range of Motion   Extension:  abnormal   Flexion:  abnormal   Lateral bend right:  abnormal   Rotation right:  abnormal   Rotation left:  abnormal     Comments:  Straight leg raise positive bilaterally  Tender to palpate lumbar sacral area  Positive pain with extension      Muscle Strength   Right Lower Extremity   Quadriceps:  5/5   Anterior tibial:  5/5   Gastrocsoleus:  5/5   Left Lower Extremity   Quadriceps:  5/5   Anterior tibial:  5/5   Gastrocsoleus:  5/5       Imagin2024 MRI Lumbar Spine Without Contrast  Order: [id number]  Status: Final result       Visible to patient: Yes (seen)       Next appt: None       Dx: Dorsalgia, unspecified    0 Result Notes  Details    Reading Physician Reading Date Result Priority   Garett Kim MD  [phone] 2024 Routine     Narrative & Impression  EXAMINATION:  MRI LUMBAR SPINE WITHOUT CONTRAST     CLINICAL HISTORY:  Low back pain, symptoms persist with > 6wks conservative treatment; Dorsalgia, unspecified     TECHNIQUE:  Multiplanar, multisequence MR images were acquired from the thoracolumbar junction to the sacrum without the administration of contrast.     COMPARISON:  CT lumbar spine dated 2023, plain films dated 2023     FINDINGS:  Vertebral column: As seen on comparison studies, there are postsurgical changes of posterior lumbar instrumented fusion and decompression of L5 and S1.  There is fused anterolisthesis of L5 on S1 measuring approximately 8 mm.  There are bilateral L5 pars defects.  There is 3 mm retrolisthesis of L4 on L5.  Vertebral body height is preserved.  There is no fracture.  There are hemangiomas in the L3 and L5 vertebral bodies.  Baseline marrow signal is normal.  The discs are mildly desiccated.  There is mild multilevel endplate osteophyte formation.     Spinal canal, conus,  epidural space: The spinal canal appears to be developmentally normal.  The conus terminates at the level of L1-2 and is normal in contour and signal intensity.     Findings by level:     T11-12: There is mild disc space narrowing.  There is a mild disc bulge and mild facet joint arthropathy.  There is however no significant spinal canal or foraminal stenosis.     T12-L1: There is a mild disc bulge with tiny central disc protrusion.  There is no spinal canal or significant foraminal stenosis or change.     L1-2: There is a mild disc bulge and facet joint arthropathy.  There is no spinal stenosis.  There is mild bilateral foraminal stenosis without definite change.     L2-3: There is a disc bulge with superimposed broad central disc protrusion.  There is bilateral facet joint arthropathy.  There is crowding of the left lateral recess.  There is mild-to-moderate bilateral foraminal stenosis without spinal stenosis.     L3-4: There is trace retrolisthesis of L3 on L4.  There is a moderate diffuse disc bulge with small superimposed central disc extrusion.  There is right greater than left facet joint arthropathy.  There is mild to moderate spinal stenosis.  There is crowding of the lateral recess bilaterally.  There is moderate left and mild-to-moderate right foraminal stenosis without significant change.     L4-5: There is mild retrolisthesis of L4 on L5.  There is a moderate diffuse disc bulge with superimposed central and right paracentral disc extrusion with slight caudad extension crowding the right lateral recess.  There is bilateral facet joint arthropathy.  There is no spinal stenosis.  There is however moderate to severe bilateral foraminal stenosis without significant change.     L5-S1: There is fused anterolisthesis of L5 on S1 with unroofing of a moderate broad disc protrusion.  There is facet joint arthropathy.  There is no spinal stenosis.  There is severe bilateral foraminal stenosis.     Soft tissues,  other: The posterior spinous muscles are disrupted by surgery.  The prevertebral soft tissues are normal.  The aorta is mildly ectatic but not aneurysmal.  There is no hydronephrosis.     Impression:     1. There are degenerative and postsurgical changes discussed in detail by level above.  There is no significant change compared to the prior CT.  There has been posterior decompression and fusion of L5 and S1.  There is fused anterolisthesis of L5 on S1.  There is no spinal stenosis at this level but there is severe bilateral foraminal stenosis.  2. At the L4-5 level there is moderate to severe bilateral foraminal stenosis without spinal stenosis but there is crowding of the right lateral recess.  3. At the L3-4 level there is mild-to-moderate spinal stenosis with crowding of the lateral recess bilaterally.  Additionally there is moderate left and mild-to-moderate right foraminal stenosis.  4. At the L2-3 level there is mild-to-moderate bilateral foraminal stenosis, crowding of the left lateral recess without spinal stenosis.        Electronically signed by:Garett Kim MD  Date:                                            07/23/2024  Time:                                           17:04           Exam Ended: 07/23/24 16:55 CDT Last Resulted: 07/23/24 17:04 CDT             Labs:  BMP  Lab Results   Component Value Date     01/06/2024    K 4.1 01/06/2024    CL 98 01/06/2024    CO2 26 01/06/2024    BUN 14 01/06/2024    CREATININE 0.9 01/06/2024    CALCIUM 9.4 01/06/2024    ANIONGAP 12 01/06/2024    EGFRNORACEVR >60.0 01/06/2024     Lab Results   Component Value Date    ALT 27 01/06/2024    AST 13 01/06/2024    ALKPHOS 88 01/06/2024    BILITOT 0.7 01/06/2024     Lab Results   Component Value Date    WBC 9.30 09/11/2023    HGB 15.8 01/06/2024    HCT 42.8 09/11/2023    MCV 87 09/11/2023     09/11/2023           Assessment:  Problem List Items Addressed This Visit    None  Visit Diagnoses       Lumbar  radiculopathy                08/27/2024 - Shauna Barrera is a 53 y.o. male who  has a past medical history of Anticoagulant long-term use, Arthritis, Coronary artery disease, Diabetes mellitus type I, Hypertension associated with diabetes (07/28/2023), and PONV (postoperative nausea and vomiting).  By history and examination this patient has chronic low back  with bilateral  radiculopathy.  The underlying cause cause is facet arthritis, degenerative disc disease, foraminal stenosis, and central canal stenosis.  Pathology is confirmed by imaging.  We discussed the underlying diagnoses and multiple treatment options including non-opioid medications, interventional procedures, physical therapy, home exercise, core muscle enhancement, and activity modification.  The risks and benefits of each treatment option were discussed and all questions were answered.      Treatment Plan:   Procedures: s/f a Bilateral L4-5 TFESI, if unattainable please consider caudal CONRAD  PT/OT/HEP: I have stressed the importance of physical activity and a home exercise plan to help with pain and improve health.  Medications:  Start Lyrica 150 mg daily insurance stop covering Gabapentin.   Labs; ordered CMP and A1c discussed with the patient they will pull his BS day of the procedure and if greater than 250 procedure will not be able to be performed.   -  Not applicable  Imaging:  Reviewed and discussed in detail using spine model and images from chart.    Follow Up: RTC 2-3 weeks after injection    NU[facility]  Interventional Pain [facility]      Disclaimer: This note was partly generated using dictation software which may occasionally result in transcription errors.

## 2024-08-27 NOTE — TELEPHONE ENCOUNTER
----- Message from MORTEZA Rojas sent at 2024  1:23 PM CDT -----  Regarding: Order for ZULEYMA TUCKER    Patient Name: ZULEYMA TUCKER([id number])  Sex: Male  : 1970      PCP: LOVE-GROVE, VIOLET    Center: [facility]     Types of orders made on 2024: Lab, Outpatient Referral, Point of Care                                       Testing, Procedure Request    Order Date:2024  Ordering User:CLAUDE HAMMOND [[id number]]  Encounter Provider:Fred Hammond FNP [[id number]]  Authorizing Provider: Fred Hammond FNP [[id number]]  Supervising Provider:NANCY NGUYEN [[id number]]  Type of Supervision:Collaborating Physician  Department:[facility] PAIN MANAGEMENT[[id number]]    Common Order Information  Procedure -> Transforaminal Injection (Specify level and laterality) Cmt:             Bilateral L4-5    Pre-op Diagnosis -> Spinal stenosis at L4  -L5 level       -> Lumbar radiculopathy       -> History of fusion of lumbar spine     Order Specific Information  Order: Procedure Request Order for Pain Management [Custom: [id number]]  Order #:          [id number]: 1 FUTURE    Priority: Routine  Class: Clinic Performed    Future Order Information      Expires on:2025            Expected by:2024                   Comment:If L4-5   epidural space is unobtainable please consider  a caudal    Associated Diagnoses      M54.16 Lumbar radiculopathy      M51.36 DDD (degenerative disc disease), lumbar      M48.062 Spinal stenosis of lumbar region with neurogenic claudication      Physician -> Shilpi         Is patient on anti-coagulants? -> No         Facility Name: -> [facility]         Follow-up: -> 2 weeks           Priority: Routine  Class: Clinic Performed      Future Order Information      Expires on:2025            Expected by:2024                   Comment:If L4-5 epidural space is unobtainable please consider  a caudal    Associated Diagnoses      M54.16 Lumbar radiculopathy      M51.36  DDD (degenerative disc disease), lumbar      M48.062 Spinal stenosis of lumbar region with neurogenic claudication      Procedure -> Transforaminal Injection (Specify level and later  ality) Cmt:                 Bilateral L4-5        Physician -> Gelter         Is patient on anti-coagulants? -> No         Pre-op Diagnosis -> Spinal stenosis at L4-L5 level           -> Lumbar radiculopathy           -> History of fusion of lumbar spine         Facility Name: -> [facility]         Follow-up: -> 2 weeks

## 2024-08-27 NOTE — TELEPHONE ENCOUNTER
----- Message from MORTEZA Rojas sent at 8/27/2024  1:23 PM CDT -----  Regarding: Lyrica prescription  Please pend Lyrica 150 daily 30 days no refills due today

## 2024-08-28 PROBLEM — R94.39 ABNORMAL NUCLEAR STRESS TEST: Status: RESOLVED | Noted: 2023-08-29 | Resolved: 2024-08-28

## 2024-08-28 PROBLEM — I20.89 EQUIVALENT ANGINA: Chronic | Status: RESOLVED | Noted: 2023-08-30 | Resolved: 2024-08-28

## 2024-08-28 NOTE — TELEPHONE ENCOUNTER
----- Message from MORTEZA Rojas sent at 2024  1:23 PM CDT -----  Regarding: Order for ZULEYMA TUCKER    Patient Name: ZULEYMA TUCKER([id number])  Sex: Male  : 1970      PCP: LOVE-GROVE, VIOLET    Center: [facility]     Types of orders made on 2024: Lab, Outpatient Referral, Point of Care                                       Testing, Procedure Request    Order Date:2024  Ordering User:CLAUDE HAMMOND [[id number]]  Encounter Provider:Fred Hammond FNP [[id number]]  Authorizing Provider: Fred Hammond FNP [[id number]]  Supervising Provider:NANCY NGUYEN [[id number]]  Type of Supervision:Collaborating Physician  Department:[facility] PAIN MANAGEMENT[[id number]]    Common Order Information  Procedure -> Transforaminal Injection (Specify level and laterality) Cmt:             Bilateral L4-5    Pre-op Diagnosis -> Spinal stenosis at L4  -L5 level       -> Lumbar radiculopathy       -> History of fusion of lumbar spine     Order Specific Information  Order: Procedure Request Order for Pain Management [Custom: [id number]]  Order #:          [id number]: 1 FUTURE    Priority: Routine  Class: Clinic Performed    Future Order Information      Expires on:2025            Expected by:2024                   Comment:If L4-5   epidural space is unobtainable please consider  a caudal    Associated Diagnoses      M54.16 Lumbar radiculopathy      M51.36 DDD (degenerative disc disease), lumbar      M48.062 Spinal stenosis of lumbar region with neurogenic claudication      Physician -> Shilpi         Is patient on anti-coagulants? -> No         Facility Name: -> [facility]         Follow-up: -> 2 weeks           Priority: Routine  Class: Clinic Performed      Future Order Information      Expires on:2025            Expected by:2024                   Comment:If L4-5 epidural space is unobtainable please consider  a caudal    Associated Diagnoses      M54.16 Lumbar radiculopathy      M51.36  DDD (degenerative disc disease), lumbar      M48.062 Spinal stenosis of lumbar region with neurogenic claudication      Procedure -> Transforaminal Injection (Specify level and later  ality) Cmt:                 Bilateral L4-5        Physician -> Gelter         Is patient on anti-coagulants? -> No         Pre-op Diagnosis -> Spinal stenosis at L4-L5 level           -> Lumbar radiculopathy           -> History of fusion of lumbar spine         Facility Name: -> [place] [facility]         Follow-up: -> 2 weeks

## 2024-08-28 NOTE — PROGRESS NOTES
[facility]  Date: 9/3/24    Patient: Shauna Barrera, 1970, [id number]  Primary Care Provider: Keyal Ralph MD     Chief Complaint: Cardiac clearance     Subjective:       Shauna Barrera is a 53 y.o. male who presents for cardiac clearance. They follow with YAQUELIN Tucker.    CBC, CMP, lipid panel stable. K+ 3.4. At last visit, patient recovering well from recent PCI for which GDMT was continued as is.     Since then, patient doing well from cardiac standpoint. He has not required use of nitroglycerin since PCI in 9/2023. Notes he ran out of plavix few months ago and thus has not been taking it. Requests cardiac clearance for epidural steroid injection scheduled for 9/11/24 with Dr. Dobbins. Average -150s at home. Denies chest discomfort, dyspnea, palpitations, dizziness, syncope, orthopnea, PND, edema.    Focused Past History includes:  Coronary artery disease s/p PCI (1st diagonal) in 9/2023  TTE 7/2023: LVEF 50-55%, mild LAE, trivial TR, PASP <35  Nuclear stress 8/2023: moderate intensity mostly reversible perfusion abnormality with some fixed areas in basal to mid inferolateral walls   LHC 9/2023: 40% stenosis of distal LAD, 95% stenosis of 1st diagonal with successful PCI with 2.5x13 mm [place] SAAD, luminal irregularity of LCx, RCA diffusely diseased with some aneurysmal dilatation- 40-50% proximal narrowing, 60% mid narrowing, 40-50% distal narrowing with normal IFR  Carotid artery disease  Carotid US 8/2023: mild heterogenous plaque bilaterally without significant stenosis   Peripheral vascular disease  Arterial US 8/2023: mild PVD with no focal significant stenosis at any level in either lower extremity, 3 vessel runoff bilaterally with normal ABIs  Sinus tachycardia   Hypertension  Type 2 diabetes mellitus- uncontrolled   Tobacco abuse     Current Outpatient Medications   Medication Sig    amLODIPine (NORVASC) 5 MG tablet Take 5 mg by mouth every morning.     aspirin (ECOTRIN) 81 MG EC tablet Take by mouth once daily.    BASAGLAR KWIKPEN U-100 INSULIN glargine 100 units/mL SubQ pen ADMINISTER 20 UNITS UNDER THE SKIN EVERY 12 HOURS    losartan-hydrochlorothiazide 100-25 mg (HYZAAR) 100-25 mg per tablet Take 1 tablet by mouth every morning.    metFORMIN (GLUCOPHAGE) 1000 MG tablet Take 1 tablet (1,000 mg total) by mouth 2 (two) times daily.    methocarbamoL (ROBAXIN) 500 MG Tab Take 1 tablet (500 mg total) by mouth 3 (three) times daily.    pregabalin (LYRICA) 150 MG capsule Take 1 capsule (150 mg total) by mouth once daily.    TRULICITY 3 mg/0.5 mL pen injector every 7 days. Saturday    clopidogreL (PLAVIX) 75 mg tablet Take 1 tablet (75 mg total) by mouth once daily.    gabapentin (NEURONTIN) 300 MG capsule Take 300 mg by mouth 3 (three) times daily. (Patient not taking: Reported on 9/3/2024)    metoprolol succinate (TOPROL-XL) 50 MG 24 hr tablet Take 1 tablet (50 mg total) by mouth once daily.    nitroGLYCERIN (NITROSTAT) 0.4 MG SL tablet DISSOLVE 1 TABLET UNDER THE TONGUE AS NEEDED FOR CHEST PAIN EVERY 5 MINUTES    rosuvastatin (CRESTOR) 20 MG tablet Take 20 mg by mouth every evening. (Patient not taking: Reported on 9/3/2024)    tiZANidine (ZANAFLEX) 4 MG tablet Take 1 tablet (4 mg total) by mouth nightly as needed (muscle spasms/ pain). (Patient not taking: Reported on 9/3/2024)     No current facility-administered medications for this visit.            Objective       Review of Systems  Constitutional: negative for fevers, night sweats, and weight loss  Eyes: negative for visual disturbance, diplopia  Respiratory: negative for cough, hemoptysis, sputum, and wheezing  Cardiovascular: see HPI  Gastrointestinal: negative for abdominal pain, bright red blood per rectum, change in bowel habits, dysphagia, melena, and reflux symptoms  Genitourinary:negative for dysuria, frequency, and hematuria  Hematologic/lymphatic: negative for bleeding, easy bruising, and  "lymphadenopathy  Musculoskeletal:negative for arthralgias, back pain, and myalgias  Neurological: negative for gait problems, paresthesia, speech problems, vertigo, and weakness  Behavioral/Psych: negative for excessive alcohol consumption, illegal drug usage, and sleep disturbance    -------------------------------------    Anticoagulant long-term use    Arthritis    Coronary artery disease    Diabetes mellitus type I    Hypertension associated with diabetes    PONV (postoperative nausea and vomiting)     ----------------------------    Angiogram, coronary, with left heart catheterization    Procedure: Left Heart Cath;  Surgeon: Crissy Tucker MD;  Location: [facility] CATH;  Service: Cardiology;;    Back surgery    Finger amputation    index finger    Fractional flow reserve (ffr), coronary    Procedure: (IFR) RCA;  Surgeon: Crissy Tucker MD;  Location: [facility] CATH;  Service: Cardiology;;    Knee arthroscopy    torn meniscus    Percutaneous coronary intervention, artery    Procedure: SAAD Diagonal;  Surgeon: Crissy Tucker MD;  Location: [facility] CATH;  Service: Cardiology;;    Toe amputation    great toe        Family History   Problem Relation Name Age of Onset    No Known Problems Mother      Diabetes Father      Alzheimer's disease Father       Social History     Tobacco Use    Smoking status: Every Day     Current packs/day: 0.50     Average packs/day: 0.5 packs/day for 45.0 years (22.5 ttl pk-yrs)     Types: Cigarettes    Smokeless tobacco: Never   Substance Use Topics    Alcohol use: Not Currently    Drug use: Never       Physical Exam  BP (!) 157/94 (BP Location: Left arm, Patient Position: Sitting, BP Method: Medium (Automatic))   Pulse 82   Ht 6' 3" (1.905 m)   Wt 104.2 kg (229 lb 11.5 oz)   BMI 28.71 kg/m²   Body surface area is 2.35 meters squared.  Body mass index is 28.71 kg/m².    General appearance: alert, appears stated age, cooperative, and no distress  Head: Normocephalic, without obvious " "abnormality, atraumatic  Neck: no carotid bruit, no JVD, and supple, symmetrical, trachea midline  Lungs: clear to auscultation bilaterally  Heart: regular rate and rhythm; S1, S2 normal, no murmur, click, rub or gallop  Abdomen: soft, non-tender, no distended  Extremities: extremities atraumatic, no pitting edema  Skin: warm, no cyanosis, no pathologic ecchymosis in exposed portions  Neurologic: Grossly normal. A&O x3      Lab Review   Lab Results   Component Value Date    WBC 9.30 09/11/2023    HGB 15.8 01/06/2024    HCT 42.8 09/11/2023    MCV 87 09/11/2023     09/11/2023         BMP  Lab Results   Component Value Date     08/27/2024    K 3.4 (L) 08/27/2024     08/27/2024    CO2 25 08/27/2024    BUN 10 08/27/2024    CREATININE 1.0 08/27/2024    CALCIUM 10.4 08/27/2024    ANIONGAP 9 08/27/2024       Lab Results   Component Value Date    ALBUMIN 3.9 08/27/2024       Lab Results   Component Value Date    ALT 19 08/27/2024    AST 14 08/27/2024    ALKPHOS 92 08/27/2024    BILITOT 0.6 08/27/2024       No results found for: "TSH"    Lab Results   Component Value Date    CHOL 128 01/06/2024     Lab Results   Component Value Date    HDL 34 (L) 01/06/2024     Lab Results   Component Value Date    LDLCALC 76.8 01/06/2024     Lab Results   Component Value Date    TRIG 86 01/06/2024     Lab Results   Component Value Date    CHOLHDL 26.6 01/06/2024        EKG 8/29/24: NSR 89, , QRS 96, new T wave inversions in inferolateral leads compared to EKG 9/2023        Assessment & Plan:     This is a 53 y.o. male with CAD s/p PCI, carotid artery disease, PVD, tachycardia, HTN, type 2 DM, tobacco abuse. No active cardiac complaints. Requests cardiac clearance for epidural steroid injection scheduled for 9/11/24 with Dr. Dobbins.    1. Preoperative cardiovascular exam  - TTE 7/2023 without acute cardiac issues   - EKG today with new T wave inversions in inferolateral leads when compared to EKG in 9/2023  - Patient " reports no anginal equivalents   - Recommend repeat stress test prior to procedure secondary to EKG changes, cessation of plavix several months prior, and continue tobacco abuse   - Continue ASA 81 mg throughout the procedure. Can hold plavix for 5 days prior to procedure. Restart postoperatively as soon as patient able to tolerate.   - With the above recommendations and pending stress results, the patient is optimized for the above mentioned procedure.     2. Coronary artery disease s/p PCI 9/2023  - No anginal equivalents  - Continue ASA 81 mg qd   - Continue crestor 20 mg qd   - Continue plavix 75 mg qd   - Increase toprol-XL from 37.5 to 50 mg qd   - Continue losartan-HCTZ 100-25 mg qd     3. Mild carotid artery disease  - Carotid US 8/2023: no significant stenosis bilaterally   - Asymptomatic   - Continue ASA 81 mg qd   - Continue crestor 20 mg qd     4. Peripheral vascular disease  - Arterial US 8/2023: mild PVD with no significant stenosis   - No claudication   - Continue ASA 81 mg qd   - Continue crestor 20 mg qd     5. Tachycardia  - No palpitations, dizziness, or near-syncope/syncope   - Increase toprol-XL from 37.5 to 50 mg qd     6. Hypertension   - Uncontrolled  - Increase toprol-XL from 37.5 to 50 mg qd   - Continue losartan-HCTZ 100-25 mg qd   - Continue amlodipine 5 mg qd     7. Type 2 diabetes mellitus   - Uncontrolled with A1c of 10.3  - Continue routine labs and management per primary care    8. Tobacco abuse   - Patient weaning off cigarettes- down to 1 pk/day  - Counseled on importance of smoking cessation with extensive history of atherosclerotic disease     Emphasized the importance of modifying lifestyle related risk factors including smoking cessation, exercise, diet most resembling a Mediterranean diet.    Please follow up in 3 months or sooner if needed.      Alix Tanner PA-C  [facility] [place]  Office: [phone]

## 2024-09-03 ENCOUNTER — OFFICE VISIT (OUTPATIENT)
Dept: CARDIOLOGY | Facility: CLINIC | Age: 54
End: 2024-09-03
Payer: COMMERCIAL

## 2024-09-03 VITALS
BODY MASS INDEX: 28.57 KG/M2 | DIASTOLIC BLOOD PRESSURE: 94 MMHG | WEIGHT: 229.75 LBS | SYSTOLIC BLOOD PRESSURE: 157 MMHG | HEIGHT: 75 IN | HEART RATE: 82 BPM

## 2024-09-03 DIAGNOSIS — I25.10 CORONARY ARTERY DISEASE INVOLVING NATIVE CORONARY ARTERY OF NATIVE HEART WITHOUT ANGINA PECTORIS: ICD-10-CM

## 2024-09-03 DIAGNOSIS — Z95.5 STENTED CORONARY ARTERY: ICD-10-CM

## 2024-09-03 DIAGNOSIS — Z01.810 PREOPERATIVE CARDIOVASCULAR EXAMINATION: Primary | ICD-10-CM

## 2024-09-03 DIAGNOSIS — I15.2 HYPERTENSION ASSOCIATED WITH DIABETES: ICD-10-CM

## 2024-09-03 DIAGNOSIS — E11.59 HYPERTENSION ASSOCIATED WITH DIABETES: ICD-10-CM

## 2024-09-03 DIAGNOSIS — I77.9 MILD CAROTID ARTERY DISEASE: ICD-10-CM

## 2024-09-03 DIAGNOSIS — R00.0 TACHYCARDIA: ICD-10-CM

## 2024-09-03 DIAGNOSIS — I73.9 PVD (PERIPHERAL VASCULAR DISEASE) WITH CLAUDICATION: ICD-10-CM

## 2024-09-03 DIAGNOSIS — Z72.0 TOBACCO USE: ICD-10-CM

## 2024-09-03 PROCEDURE — 3008F BODY MASS INDEX DOCD: CPT | Mod: CPTII,S$GLB,,

## 2024-09-03 PROCEDURE — 3077F SYST BP >= 140 MM HG: CPT | Mod: CPTII,S$GLB,,

## 2024-09-03 PROCEDURE — 3046F HEMOGLOBIN A1C LEVEL >9.0%: CPT | Mod: CPTII,S$GLB,,

## 2024-09-03 PROCEDURE — 99999 PR PBB SHADOW E&M-EST. PATIENT-LVL III: CPT | Mod: PBBFAC,,,

## 2024-09-03 PROCEDURE — 1159F MED LIST DOCD IN RCRD: CPT | Mod: CPTII,S$GLB,,

## 2024-09-03 PROCEDURE — 93005 ELECTROCARDIOGRAM TRACING: CPT | Mod: PO

## 2024-09-03 PROCEDURE — 3080F DIAST BP >= 90 MM HG: CPT | Mod: CPTII,S$GLB,,

## 2024-09-03 PROCEDURE — 99214 OFFICE O/P EST MOD 30 MIN: CPT | Mod: S$GLB,,,

## 2024-09-03 PROCEDURE — 93010 ELECTROCARDIOGRAM REPORT: CPT | Mod: S$GLB,,, | Performed by: INTERNAL MEDICINE

## 2024-09-03 RX ORDER — METOPROLOL SUCCINATE 50 MG/1
50 TABLET, EXTENDED RELEASE ORAL DAILY
Qty: 90 TABLET | Refills: 3 | Status: SHIPPED | OUTPATIENT
Start: 2024-09-03 | End: 2025-09-03

## 2024-09-03 RX ORDER — CLOPIDOGREL BISULFATE 75 MG/1
75 TABLET ORAL DAILY
Qty: 30 TABLET | Refills: 11 | Status: SHIPPED | OUTPATIENT
Start: 2024-09-03 | End: 2025-09-03

## 2024-09-04 ENCOUNTER — TELEPHONE (OUTPATIENT)
Dept: CARDIOLOGY | Facility: CLINIC | Age: 54
End: 2024-09-04
Payer: COMMERCIAL

## 2024-09-04 ENCOUNTER — TELEPHONE (OUTPATIENT)
Dept: PAIN MEDICINE | Facility: CLINIC | Age: 54
End: 2024-09-04
Payer: COMMERCIAL

## 2024-09-04 ENCOUNTER — HOSPITAL ENCOUNTER (OUTPATIENT)
Dept: RADIOLOGY | Facility: HOSPITAL | Age: 54
Discharge: HOME | End: 2024-09-04
Payer: COMMERCIAL

## 2024-09-04 NOTE — TELEPHONE ENCOUNTER
patient was given a new appt for nuclear stress test. patient said he had the appt paper that said 8 but he saw lastnt a 10am time and so he thought that was when he was suppose to come in. patient has been rescheduled for after steroid injection and also confirmed again the new time he should arrive. I also told him that it will show up in my chart with 3 different times again but he should always go for the earliest time to arrive.

## 2024-09-04 NOTE — TELEPHONE ENCOUNTER
----- Message from Zehra Oden LPN sent at 9/4/2024 11:07 AM CDT -----  Regarding: RE: Clearance  Unfortunately he has new changes on his EKG and needs a stress test. That is scheduled for 9/24.  ----- Message -----  From: Rosalee Faustin  Sent: 8/28/2024   2:40 PM CDT  To: Zehra Oden LPN  Subject: RE: Clearance                                    Thank you so much.  ----- Message -----  From: Zehra Oden LPN  Sent: 8/28/2024   2:32 PM CDT  To: Zehra Oden LPN; Rosalee Faustin  Subject: FW: Clearance                                    Pt needs to be seen. He is scheduled for 9/3.  ----- Message -----  From: Crissy Tucker MD  Sent: 8/28/2024  12:39 PM CDT  To: Zehra Oden LPN  Subject: RE: Clearance                                    The patient has not been seen since his procedure last September so can not comment  ----- Message -----  From: Zehra Oden LPN  Sent: 8/28/2024  12:21 PM CDT  To: Crissy Tucker MD  Subject: FW: Clearance                                      ----- Message -----  From: Rosalee Faustin  Sent: 8/28/2024  11:23 AM CDT  To: Crissy Tucker MD; Caitlin BRODY Staff  Subject: Clearance                                        Morning,      Patient is scheduled with Dr. Dobbins for TFESI on September 11. Requesting a clearance for patient to hold Plavix for 5 days prior.       Thanks in advance,  Rosalee

## 2024-09-05 LAB
OHS QRS DURATION: 96 MS
OHS QTC CALCULATION: 455 MS

## 2024-09-20 ENCOUNTER — PATIENT MESSAGE (OUTPATIENT)
Dept: CARDIOLOGY | Facility: HOSPITAL | Age: 54
End: 2024-09-20
Payer: COMMERCIAL

## 2024-09-20 ENCOUNTER — OFFICE VISIT (OUTPATIENT)
Dept: PRIMARY CARE CLINIC | Facility: CLINIC | Age: 54
End: 2024-09-20
Payer: COMMERCIAL

## 2024-09-20 VITALS
DIASTOLIC BLOOD PRESSURE: 80 MMHG | OXYGEN SATURATION: 97 % | BODY MASS INDEX: 29.33 KG/M2 | TEMPERATURE: 98 F | HEART RATE: 91 BPM | SYSTOLIC BLOOD PRESSURE: 139 MMHG | WEIGHT: 235.88 LBS | RESPIRATION RATE: 18 BRPM | HEIGHT: 75 IN

## 2024-09-20 DIAGNOSIS — Z12.5 SCREENING FOR MALIGNANT NEOPLASM OF PROSTATE: ICD-10-CM

## 2024-09-20 DIAGNOSIS — E11.42 DM TYPE 2 WITH DIABETIC PERIPHERAL NEUROPATHY: ICD-10-CM

## 2024-09-20 DIAGNOSIS — E11.59 HYPERTENSION ASSOCIATED WITH DIABETES: ICD-10-CM

## 2024-09-20 DIAGNOSIS — Z95.5 STENTED CORONARY ARTERY: ICD-10-CM

## 2024-09-20 DIAGNOSIS — Z79.4 TYPE 2 DIABETES MELLITUS WITH FOOT ULCER, WITH LONG-TERM CURRENT USE OF INSULIN: Primary | ICD-10-CM

## 2024-09-20 DIAGNOSIS — I73.9 CLAUDICATION: ICD-10-CM

## 2024-09-20 DIAGNOSIS — Z00.00 WELLNESS EXAMINATION: ICD-10-CM

## 2024-09-20 DIAGNOSIS — M79.605 LEG PAIN, DIFFUSE, LEFT: ICD-10-CM

## 2024-09-20 DIAGNOSIS — L97.509 TYPE 2 DIABETES MELLITUS WITH FOOT ULCER, WITH LONG-TERM CURRENT USE OF INSULIN: Primary | ICD-10-CM

## 2024-09-20 DIAGNOSIS — I15.2 HYPERTENSION ASSOCIATED WITH DIABETES: ICD-10-CM

## 2024-09-20 DIAGNOSIS — E11.621 TYPE 2 DIABETES MELLITUS WITH FOOT ULCER, WITH LONG-TERM CURRENT USE OF INSULIN: Primary | ICD-10-CM

## 2024-09-20 LAB
ALBUMIN SERPL BCP-MCNC: 4 G/DL (ref 3.5–5.2)
ALBUMIN/CREAT UR: 466.1 UG/MG (ref 0–30)
ALP SERPL-CCNC: 82 U/L (ref 55–135)
ALT SERPL W/O P-5'-P-CCNC: 26 U/L (ref 10–44)
ANION GAP SERPL CALC-SCNC: 13 MMOL/L (ref 8–16)
AST SERPL-CCNC: 19 U/L (ref 10–40)
BASOPHILS # BLD AUTO: 0.13 K/UL (ref 0–0.2)
BASOPHILS NFR BLD: 1.3 % (ref 0–1.9)
BILIRUB SERPL-MCNC: 0.6 MG/DL (ref 0.1–1)
BUN SERPL-MCNC: 10 MG/DL (ref 6–20)
CALCIUM SERPL-MCNC: 9.4 MG/DL (ref 8.7–10.5)
CHLORIDE SERPL-SCNC: 102 MMOL/L (ref 95–110)
CHOLEST SERPL-MCNC: 192 MG/DL (ref 120–199)
CHOLEST/HDLC SERPL: 5.2 {RATIO} (ref 2–5)
CO2 SERPL-SCNC: 25 MMOL/L (ref 23–29)
CREAT SERPL-MCNC: 0.8 MG/DL (ref 0.5–1.4)
CREAT UR-MCNC: 121 MG/DL (ref 23–375)
DIFFERENTIAL METHOD BLD: ABNORMAL
EOSINOPHIL # BLD AUTO: 0.4 K/UL (ref 0–0.5)
EOSINOPHIL NFR BLD: 3.8 % (ref 0–8)
ERYTHROCYTE [DISTWIDTH] IN BLOOD BY AUTOMATED COUNT: 13 % (ref 11.5–14.5)
EST. GFR  (NO RACE VARIABLE): >60 ML/MIN/1.73 M^2
GLUCOSE SERPL-MCNC: 218 MG/DL (ref 70–110)
HCT VFR BLD AUTO: 48 % (ref 40–54)
HDLC SERPL-MCNC: 37 MG/DL (ref 40–75)
HDLC SERPL: 19.3 % (ref 20–50)
HGB BLD-MCNC: 15.8 G/DL (ref 14–18)
IMM GRANULOCYTES # BLD AUTO: 0.28 K/UL (ref 0–0.04)
IMM GRANULOCYTES NFR BLD AUTO: 2.7 % (ref 0–0.5)
LDLC SERPL CALC-MCNC: 130.6 MG/DL (ref 63–159)
LYMPHOCYTES # BLD AUTO: 2.5 K/UL (ref 1–4.8)
LYMPHOCYTES NFR BLD: 24.3 % (ref 18–48)
MCH RBC QN AUTO: 30.5 PG (ref 27–31)
MCHC RBC AUTO-ENTMCNC: 32.9 G/DL (ref 32–36)
MCV RBC AUTO: 93 FL (ref 82–98)
MICROALBUMIN UR DL<=1MG/L-MCNC: 564 UG/ML
MONOCYTES # BLD AUTO: 0.7 K/UL (ref 0.3–1)
MONOCYTES NFR BLD: 7 % (ref 4–15)
NEUTROPHILS # BLD AUTO: 6.3 K/UL (ref 1.8–7.7)
NEUTROPHILS NFR BLD: 60.9 % (ref 38–73)
NONHDLC SERPL-MCNC: 155 MG/DL
NRBC BLD-RTO: 0 /100 WBC
PLATELET # BLD AUTO: 206 K/UL (ref 150–450)
PMV BLD AUTO: 12.4 FL (ref 9.2–12.9)
POTASSIUM SERPL-SCNC: 4.3 MMOL/L (ref 3.5–5.1)
PROT SERPL-MCNC: 6.9 G/DL (ref 6–8.4)
RBC # BLD AUTO: 5.18 M/UL (ref 4.6–6.2)
SODIUM SERPL-SCNC: 140 MMOL/L (ref 136–145)
TRIGL SERPL-MCNC: 122 MG/DL (ref 30–150)
WBC # BLD AUTO: 10.33 K/UL (ref 3.9–12.7)

## 2024-09-20 PROCEDURE — 85025 COMPLETE CBC W/AUTO DIFF WBC: CPT | Performed by: PHYSICIAN ASSISTANT

## 2024-09-20 PROCEDURE — 87389 HIV-1 AG W/HIV-1&-2 AB AG IA: CPT | Performed by: PHYSICIAN ASSISTANT

## 2024-09-20 PROCEDURE — 86803 HEPATITIS C AB TEST: CPT | Performed by: PHYSICIAN ASSISTANT

## 2024-09-20 PROCEDURE — 84439 ASSAY OF FREE THYROXINE: CPT | Performed by: PHYSICIAN ASSISTANT

## 2024-09-20 PROCEDURE — 84481 FREE ASSAY (FT-3): CPT | Performed by: PHYSICIAN ASSISTANT

## 2024-09-20 PROCEDURE — 84153 ASSAY OF PSA TOTAL: CPT | Performed by: PHYSICIAN ASSISTANT

## 2024-09-20 PROCEDURE — 82570 ASSAY OF URINE CREATININE: CPT | Performed by: PHYSICIAN ASSISTANT

## 2024-09-20 PROCEDURE — 80061 LIPID PANEL: CPT | Performed by: PHYSICIAN ASSISTANT

## 2024-09-20 PROCEDURE — 80053 COMPREHEN METABOLIC PANEL: CPT | Performed by: PHYSICIAN ASSISTANT

## 2024-09-20 PROCEDURE — 84443 ASSAY THYROID STIM HORMONE: CPT | Performed by: PHYSICIAN ASSISTANT

## 2024-09-20 RX ORDER — TIRZEPATIDE 2.5 MG/.5ML
2.5 INJECTION, SOLUTION SUBCUTANEOUS
Qty: 4 PEN | Refills: 11 | Status: SHIPPED | OUTPATIENT
Start: 2024-09-20

## 2024-09-20 NOTE — PROGRESS NOTES
Subjective     Patient ID: Shauna Barrera is a 53 y.o. male.    Chief Complaint: Establish Care    Shauna is a 54 y/o with a hx of DM, HTN, CAD, and chronic back pain presents to clinic to establish care. Patient is here today since his previous provider is no longer accepted by his insurance. He states his sugars and A1C have been elevated since he has been unable to obtain truclicity at any of his local pharmacy. He reports chronic lower back pain, intermittent left calf cramping with walking, numbness, and tingling in the lower extremities. He denies any recent illnesses. Denies dysuria, urinary frequency, polydipsia, polyuria, AMS, fever, and fatigue. Denies any SE with current medication regimen.     No other concerns reported at this time.      Review of Systems   Constitutional:  Negative for fever.   Respiratory:  Negative for shortness of breath.    Cardiovascular:  Positive for claudication. Negative for chest pain.   Endocrine: Negative for polydipsia, polyphagia and polyuria.   Musculoskeletal:  Positive for back pain and gait problem.   Neurological:  Negative for dizziness, syncope and weakness.   All other systems reviewed and are negative.         Objective   Past Medical History:   Diagnosis Date    Anticoagulant long-term use     Arthritis     Coronary artery disease     Diabetes mellitus type I     Hypertension associated with diabetes 07/28/2023    PONV (postoperative nausea and vomiting)        Past Surgical History:   Procedure Laterality Date    ANGIOGRAM, CORONARY, WITH LEFT HEART CATHETERIZATION  9/11/2023    Procedure: Left Heart Cath;  Surgeon: Crissy Tucker MD;  Location: [facility] CATH;  Service: Cardiology;;    BACK SURGERY  2018    FINGER AMPUTATION Right     index finger    FRACTIONAL FLOW RESERVE (FFR), CORONARY  9/11/2023    Procedure: (IFR) RCA;  Surgeon: Crissy Tucker MD;  Location: [facility] CATH;  Service: Cardiology;;    KNEE ARTHROSCOPY Right     torn meniscus    PERCUTANEOUS  CORONARY INTERVENTION, ARTERY  9/11/2023    Procedure: SAAD Diagonal;  Surgeon: Crissy Tucker MD;  Location: [facility];  Service: Cardiology;;    TOE AMPUTATION Right     great toe       Family History   Problem Relation Name Age of Onset    No Known Problems Mother      Diabetes Father      Alzheimer's disease Father         Social History     Socioeconomic History    Marital status: Significant Other   Tobacco Use    Smoking status: Every Day     Current packs/day: 0.50     Average packs/day: 0.5 packs/day for 45.0 years (22.5 ttl pk-yrs)     Types: Cigarettes    Smokeless tobacco: Never   Substance and Sexual Activity    Alcohol use: Not Currently    Drug use: Never    Sexual activity: Not Currently       Review of patient's allergies indicates:  No Known Allergies      Current Outpatient Medications:     amLODIPine (NORVASC) 5 MG tablet, Take 5 mg by mouth every morning., Disp: , Rfl:     aspirin (ECOTRIN) 81 MG EC tablet, Take by mouth once daily., Disp: , Rfl:     BASAGLAR KWIKPEN U-100 INSULIN glargine 100 units/mL SubQ pen, ADMINISTER 20 UNITS UNDER THE SKIN EVERY 12 HOURS, Disp: , Rfl:     clopidogreL (PLAVIX) 75 mg tablet, Take 1 tablet (75 mg total) by mouth once daily., Disp: 30 tablet, Rfl: 11    gabapentin (NEURONTIN) 300 MG capsule, Take 300 mg by mouth 3 (three) times daily., Disp: , Rfl:     losartan-hydrochlorothiazide 100-25 mg (HYZAAR) 100-25 mg per tablet, Take 1 tablet by mouth every morning., Disp: , Rfl:     methocarbamoL (ROBAXIN) 500 MG Tab, Take 1 tablet (500 mg total) by mouth 3 (three) times daily., Disp: 60 tablet, Rfl: 1    metoprolol succinate (TOPROL-XL) 50 MG 24 hr tablet, Take 1 tablet (50 mg total) by mouth once daily., Disp: 90 tablet, Rfl: 3    nitroGLYCERIN (NITROSTAT) 0.4 MG SL tablet, DISSOLVE 1 TABLET UNDER THE TONGUE AS NEEDED FOR CHEST PAIN EVERY 5 MINUTES, Disp: 25 tablet, Rfl: 0    TRULICITY 3 mg/0.5 mL pen injector, every 7 days. Saturday, Disp: , Rfl:     metFORMIN  "(GLUCOPHAGE) 1000 MG tablet, Take 1 tablet (1,000 mg total) by mouth 2 (two) times daily., Disp: 60 tablet, Rfl: 0    rosuvastatin (CRESTOR) 20 MG tablet, Take 20 mg by mouth every evening. (Patient not taking: Reported on 9/3/2024), Disp: , Rfl:     tirzepatide (MOUNJARO) 2.5 mg/0.5 mL PnIj, Inject 2.5 mg into the skin every 7 days., Disp: 4 Pen, Rfl: 11    tiZANidine (ZANAFLEX) 4 MG tablet, Take 1 tablet (4 mg total) by mouth nightly as needed (muscle spasms/ pain). (Patient not taking: Reported on 9/3/2024), Disp: 40 tablet, Rfl: 0    /80   Pulse 91   Temp 97.8 °F (36.6 °C)   Resp 18   Ht 6' 3" (1.905 m)   Wt 107 kg (235 lb 14.3 oz)   SpO2 97%   BMI 29.48 kg/m²    Physical Exam  Vitals and nursing note reviewed.   Constitutional:       Appearance: Normal appearance.   HENT:      Head: Normocephalic and atraumatic.      Mouth/Throat:      Mouth: Mucous membranes are moist.   Eyes:      Conjunctiva/sclera: Conjunctivae normal.   Cardiovascular:      Rate and Rhythm: Normal rate and regular rhythm.      Pulses: Normal pulses.      Heart sounds: Normal heart sounds. No murmur heard.     No gallop.   Pulmonary:      Breath sounds: Normal breath sounds. No wheezing, rhonchi or rales.   Abdominal:      General: Abdomen is flat. Bowel sounds are normal.      Palpations: Abdomen is soft.   Musculoskeletal:         General: Normal range of motion.      Cervical back: Normal range of motion.      Lumbar back: Tenderness present.      Left lower leg: Deformity: reproducible tenderness noted to the left calf.   Skin:     General: Skin is warm.      Capillary Refill: Capillary refill takes less than 2 seconds.   Neurological:      General: No focal deficit present.      Mental Status: He is alert and oriented to person, place, and time. Mental status is at baseline.   Psychiatric:         Mood and Affect: Mood normal.         Behavior: Behavior normal.         Thought Content: Thought content normal.         " Judgment: Judgment normal.     Protective Sensation (w/ 10 gram monofilament):  Right: Absent  Left: Absent    Visual Inspection:  Normal -  Bilateral and Nails Intact - without Evidence of Foot Deformity- Bilateral    Pedal Pulses:   Right: Diminished  Left: Diminished    Posterior Tibialis Pulses:   Right:Diminished  Left: Diminished      Assessment and Plan     1. Type 2 diabetes mellitus with foot ulcer, with long-term current use of insulin  -     Comprehensive Metabolic Panel; Future; Expected date: 09/20/2024  -     Hemoglobin A1C; Future; Expected date: 09/20/2024  -     CBC Auto Differential; Future; Expected date: 09/20/2024  -     TSH; Future; Expected date: 09/20/2024  -     T3, Free; Future; Expected date: 09/20/2024  -     T4, Free; Future; Expected date: 09/20/2024  -     MICROALBUMIN / CREATININE RATIO URINE  -     tirzepatide (MOUNJARO) 2.5 mg/0.5 mL PnIj; Inject 2.5 mg into the skin every 7 days.  Dispense: 4 Pen; Refill: 11    2. DM type 2 with diabetic peripheral neuropathy    3. Stented coronary artery  -     Lipid Panel; Future; Expected date: 09/20/2024    4. Hypertension associated with diabetes    5. Screening for malignant neoplasm of prostate  -     PSA, Screening; Future; Expected date: 09/20/2024    6. Wellness examination  -     HEPATITIS C ANTIBODY; Future; Expected date: 09/20/2024  -     HIV 1/2 Ag/Ab (4th Gen); Future; Expected date: 09/20/2024    7. Leg pain, diffuse, left  -     US Lower Extremity Veins Left; Future; Expected date: 09/20/2024    8. Claudication  -     Cancel: US Lower Extremity Arteries Bilateral; Future; Expected date: 09/20/2024  -     US Lower Extrem Arteries Bilat with LAURIE (xpd); Future; Expected date: 09/20/2024        I spent 30 minutes on this encounter, time includes face-to-face, chart review, documentation, test review and orders.          Eric Park PA-C

## 2024-09-21 LAB
COMPLEXED PSA SERPL-MCNC: 0.38 NG/ML (ref 0–4)
HCV AB SERPL QL IA: NORMAL
HIV 1+2 AB+HIV1 P24 AG SERPL QL IA: NORMAL
T3FREE SERPL-MCNC: 3.3 PG/ML (ref 2.3–4.2)
T4 FREE SERPL-MCNC: 0.97 NG/DL (ref 0.71–1.51)
TSH SERPL DL<=0.005 MIU/L-ACNC: 1.11 UIU/ML (ref 0.4–4)

## 2024-09-23 ENCOUNTER — HOSPITAL ENCOUNTER (OUTPATIENT)
Dept: RADIOLOGY | Facility: HOSPITAL | Age: 54
Discharge: HOME | End: 2024-09-23
Attending: PHYSICIAN ASSISTANT
Payer: COMMERCIAL

## 2024-09-23 DIAGNOSIS — I73.9 CLAUDICATION: ICD-10-CM

## 2024-09-23 DIAGNOSIS — I73.9 PAD (PERIPHERAL ARTERY DISEASE): Primary | ICD-10-CM

## 2024-09-23 DIAGNOSIS — M79.605 LEG PAIN, DIFFUSE, LEFT: ICD-10-CM

## 2024-09-23 PROCEDURE — 93925 LOWER EXTREMITY STUDY: CPT | Mod: 26,,, | Performed by: RADIOLOGY

## 2024-09-23 PROCEDURE — 93922 UPR/L XTREMITY ART 2 LEVELS: CPT | Mod: TC,PO

## 2024-09-23 PROCEDURE — 93922 UPR/L XTREMITY ART 2 LEVELS: CPT | Mod: 26,,, | Performed by: RADIOLOGY

## 2024-09-23 PROCEDURE — 93971 EXTREMITY STUDY: CPT | Mod: 26,LT,, | Performed by: RADIOLOGY

## 2024-09-23 PROCEDURE — 93971 EXTREMITY STUDY: CPT | Mod: TC,PO,LT

## 2024-09-23 PROCEDURE — 93925 LOWER EXTREMITY STUDY: CPT | Mod: TC,PO

## 2024-09-24 ENCOUNTER — HOSPITAL ENCOUNTER (OUTPATIENT)
Dept: CARDIOLOGY | Facility: HOSPITAL | Age: 54
Discharge: HOME | End: 2024-09-24
Payer: COMMERCIAL

## 2024-09-24 ENCOUNTER — HOSPITAL ENCOUNTER (OUTPATIENT)
Dept: RADIOLOGY | Facility: HOSPITAL | Age: 54
Discharge: HOME | End: 2024-09-24
Payer: COMMERCIAL

## 2024-09-24 VITALS — WEIGHT: 229 LBS | HEIGHT: 75 IN | BODY MASS INDEX: 28.47 KG/M2

## 2024-09-24 DIAGNOSIS — I25.10 CORONARY ARTERY DISEASE INVOLVING NATIVE CORONARY ARTERY OF NATIVE HEART WITHOUT ANGINA PECTORIS: ICD-10-CM

## 2024-09-24 DIAGNOSIS — Z95.5 STENTED CORONARY ARTERY: ICD-10-CM

## 2024-09-24 LAB
CV PHARM DOSE: 0.4 MG
CV STRESS BASE HR: 81 BPM
DIASTOLIC BLOOD PRESSURE: 72 MMHG
NUC REST EJECTION FRACTION: 47
OHS CV CPX 1 MINUTE RECOVERY HEART RATE: 98 BPM
OHS CV CPX 85 PERCENT MAX PREDICTED HEART RATE MALE: 143
OHS CV CPX MAX PREDICTED HEART RATE: 168
OHS CV CPX PATIENT IS FEMALE: 0
OHS CV CPX PATIENT IS MALE: 1
OHS CV CPX PEAK DIASTOLIC BLOOD PRESSURE: 64 MMHG
OHS CV CPX PEAK HEAR RATE: 104 BPM
OHS CV CPX PEAK RATE PRESSURE PRODUCT: NORMAL
OHS CV CPX PEAK SYSTOLIC BLOOD PRESSURE: 122 MMHG
OHS CV CPX PERCENT MAX PREDICTED HEART RATE ACHIEVED: 62
OHS CV CPX RATE PRESSURE PRODUCT PRESENTING: 9315
OHS CV PHARM TIME: 1431 MIN
SYSTOLIC BLOOD PRESSURE: 115 MMHG

## 2024-09-24 PROCEDURE — 78452 HT MUSCLE IMAGE SPECT MULT: CPT | Mod: PO

## 2024-09-24 PROCEDURE — 93016 CV STRESS TEST SUPVJ ONLY: CPT | Mod: ,,, | Performed by: INTERNAL MEDICINE

## 2024-09-24 PROCEDURE — 78452 HT MUSCLE IMAGE SPECT MULT: CPT | Mod: 26,,, | Performed by: INTERNAL MEDICINE

## 2024-09-24 PROCEDURE — A9502 TC99M TETROFOSMIN: HCPCS | Mod: PO

## 2024-09-24 PROCEDURE — 63600175 PHARM REV CODE 636 W HCPCS: Mod: PO

## 2024-09-24 PROCEDURE — 93018 CV STRESS TEST I&R ONLY: CPT | Mod: ,,, | Performed by: INTERNAL MEDICINE

## 2024-09-24 PROCEDURE — 93017 CV STRESS TEST TRACING ONLY: CPT | Mod: PO

## 2024-09-24 RX ORDER — REGADENOSON 0.08 MG/ML
0.4 INJECTION, SOLUTION INTRAVENOUS
Status: COMPLETED | OUTPATIENT
Start: 2024-09-24 | End: 2024-09-24

## 2024-09-24 RX ADMIN — REGADENOSON 0.4 MG: 0.08 INJECTION, SOLUTION INTRAVENOUS at 02:09

## 2024-09-24 RX ADMIN — TETROFOSMIN 11 MILLICURIE: 1.38 INJECTION, POWDER, LYOPHILIZED, FOR SOLUTION INTRAVENOUS at 02:09

## 2024-09-24 RX ADMIN — TETROFOSMIN 33 MILLICURIE: 1.38 INJECTION, POWDER, LYOPHILIZED, FOR SOLUTION INTRAVENOUS at 02:09

## 2024-09-25 DIAGNOSIS — E11.9 TYPE 2 DIABETES MELLITUS WITHOUT COMPLICATION: ICD-10-CM

## 2024-09-27 DIAGNOSIS — I49.3 PVC (PREMATURE VENTRICULAR CONTRACTION): Primary | ICD-10-CM

## 2024-10-01 ENCOUNTER — TELEPHONE (OUTPATIENT)
Dept: CARDIOLOGY | Facility: CLINIC | Age: 54
End: 2024-10-01
Payer: COMMERCIAL

## 2024-10-01 NOTE — TELEPHONE ENCOUNTER
----- Message from Meg sent at 10/1/2024 10:31 AM CDT -----  Regarding: advise  Contact: [facility]  Type: Needs Medical Advice  Who Called:  [facility]   Symptoms (please be specific):    How long has patient had these symptoms:    Pharmacy name and phone #:    Best Call Back Number: [phone]  Additional Information: sent over a fax send back fax: [phone]

## 2024-10-03 ENCOUNTER — OFFICE VISIT (OUTPATIENT)
Dept: VASCULAR SURGERY | Facility: CLINIC | Age: 54
End: 2024-10-03
Payer: COMMERCIAL

## 2024-10-03 VITALS
BODY MASS INDEX: 29.58 KG/M2 | SYSTOLIC BLOOD PRESSURE: 146 MMHG | HEART RATE: 91 BPM | WEIGHT: 236.69 LBS | DIASTOLIC BLOOD PRESSURE: 93 MMHG

## 2024-10-03 DIAGNOSIS — I73.9 PAD (PERIPHERAL ARTERY DISEASE): Primary | ICD-10-CM

## 2024-10-03 DIAGNOSIS — F17.200 SMOKER: ICD-10-CM

## 2024-10-03 DIAGNOSIS — I73.9 PVD (PERIPHERAL VASCULAR DISEASE) WITH CLAUDICATION: Primary | ICD-10-CM

## 2024-10-03 PROCEDURE — 1160F RVW MEDS BY RX/DR IN RCRD: CPT | Mod: CPTII,S$GLB,, | Performed by: THORACIC SURGERY (CARDIOTHORACIC VASCULAR SURGERY)

## 2024-10-03 PROCEDURE — 1159F MED LIST DOCD IN RCRD: CPT | Mod: CPTII,S$GLB,, | Performed by: THORACIC SURGERY (CARDIOTHORACIC VASCULAR SURGERY)

## 2024-10-03 PROCEDURE — 3008F BODY MASS INDEX DOCD: CPT | Mod: CPTII,S$GLB,, | Performed by: THORACIC SURGERY (CARDIOTHORACIC VASCULAR SURGERY)

## 2024-10-03 PROCEDURE — 3066F NEPHROPATHY DOC TX: CPT | Mod: CPTII,S$GLB,, | Performed by: THORACIC SURGERY (CARDIOTHORACIC VASCULAR SURGERY)

## 2024-10-03 PROCEDURE — 99999 PR PBB SHADOW E&M-EST. PATIENT-LVL III: CPT | Mod: PBBFAC,,, | Performed by: THORACIC SURGERY (CARDIOTHORACIC VASCULAR SURGERY)

## 2024-10-03 PROCEDURE — 99204 OFFICE O/P NEW MOD 45 MIN: CPT | Mod: S$GLB,,, | Performed by: THORACIC SURGERY (CARDIOTHORACIC VASCULAR SURGERY)

## 2024-10-03 PROCEDURE — 3077F SYST BP >= 140 MM HG: CPT | Mod: CPTII,S$GLB,, | Performed by: THORACIC SURGERY (CARDIOTHORACIC VASCULAR SURGERY)

## 2024-10-03 PROCEDURE — 3062F POS MACROALBUMINURIA REV: CPT | Mod: CPTII,S$GLB,, | Performed by: THORACIC SURGERY (CARDIOTHORACIC VASCULAR SURGERY)

## 2024-10-03 PROCEDURE — 3046F HEMOGLOBIN A1C LEVEL >9.0%: CPT | Mod: CPTII,S$GLB,, | Performed by: THORACIC SURGERY (CARDIOTHORACIC VASCULAR SURGERY)

## 2024-10-03 PROCEDURE — 3080F DIAST BP >= 90 MM HG: CPT | Mod: CPTII,S$GLB,, | Performed by: THORACIC SURGERY (CARDIOTHORACIC VASCULAR SURGERY)

## 2024-10-03 RX ORDER — CHLORHEXIDINE GLUCONATE ORAL RINSE 1.2 MG/ML
10 SOLUTION DENTAL
OUTPATIENT
Start: 2024-10-03

## 2024-10-03 RX ORDER — MUPIROCIN 20 MG/G
OINTMENT TOPICAL
OUTPATIENT
Start: 2024-10-03

## 2024-10-03 NOTE — PROGRESS NOTES
This patient has peripheral arterial disease.  He has disabling left lower extremity claudication.  Recent ultrasound shows occlusion of the left femoral artery.  The patient was quite symptomatic.    Past history is is pertinent for longstanding diabetes and hypertension.  He has been a smoker.  He has had no recent surgeries.  But he has had diabetic foot infections and an amputation of the right great toe.  He does take aspirin and Plavix.  He also states that he has a heart arrhythmia for which he is supposed to have a loop recorder placed.    He denies any chest pain or shortness of breath.    On exam vital signs are stable.  Pupils are equal and round reactive to light.  Neck is supple.  Chest is equal breath sounds.  Heart is in a regular rate and rhythm.  Abdomen is benign.  He has diminished pulses in the right foot.  The right foot is cooler than the left.  He has a palpable dorsalis pedis pulse on the right.    Recent ultrasound is noted.    Recommendation is for angiography of the extremities and intervention as warranted.  The patient seems to be understanding and agreeable.

## 2024-10-08 ENCOUNTER — HOSPITAL ENCOUNTER (OUTPATIENT)
Dept: CARDIOLOGY | Facility: HOSPITAL | Age: 54
Discharge: HOME | End: 2024-10-08
Payer: COMMERCIAL

## 2024-10-08 ENCOUNTER — PATIENT MESSAGE (OUTPATIENT)
Dept: ADMINISTRATIVE | Facility: HOSPITAL | Age: 54
End: 2024-10-08
Payer: COMMERCIAL

## 2024-10-08 VITALS — WEIGHT: 236 LBS | BODY MASS INDEX: 29.34 KG/M2 | HEIGHT: 75 IN

## 2024-10-08 DIAGNOSIS — I49.3 PVC (PREMATURE VENTRICULAR CONTRACTION): ICD-10-CM

## 2024-10-08 LAB
ASCENDING AORTA: 2.93 CM
AV INDEX (PROSTH): 0.68
AV MEAN GRADIENT: 5.4 MMHG
AV PEAK GRADIENT: 9 MMHG
AV VALVE AREA BY VELOCITY RATIO: 3.3 CM²
AV VALVE AREA: 3.3 CM²
AV VELOCITY RATIO: 0.67
BSA FOR ECHO PROCEDURE: 2.38 M2
CV ECHO LV RWT: 0.27 CM
DOP CALC AO PEAK VEL: 1.5 M/S
DOP CALC AO VTI: 30.2 CM
DOP CALC LVOT AREA: 4.9 CM2
DOP CALC LVOT DIAMETER: 2.5 CM
DOP CALC LVOT PEAK VEL: 1 M/S
DOP CALC LVOT STROKE VOLUME: 100.6 CM3
DOP CALCLVOT PEAK VEL VTI: 20.5 CM
E WAVE DECELERATION TIME: 125.64 MSEC
E/A RATIO: 0.87
E/E' RATIO: 6.61 M/S
ECHO LV POSTERIOR WALL: 0.8 CM (ref 0.6–1.1)
EJECTION FRACTION: 50 %
FRACTIONAL SHORTENING: 23.7 % (ref 28–44)
INTERVENTRICULAR SEPTUM: 1.2 CM (ref 0.6–1.1)
LEFT ATRIUM AREA SYSTOLIC (APICAL 2 CHAMBER): 20.15 CM2
LEFT ATRIUM AREA SYSTOLIC (APICAL 4 CHAMBER): 25.14 CM2
LEFT ATRIUM SIZE: 4.42 CM
LEFT ATRIUM VOLUME INDEX MOD: 31.7 ML/M2
LEFT ATRIUM VOLUME MOD: 74.44 ML
LEFT INTERNAL DIMENSION IN SYSTOLE: 4.5 CM (ref 2.1–4)
LEFT VENTRICLE DIASTOLIC VOLUME INDEX: 73.31 ML/M2
LEFT VENTRICLE DIASTOLIC VOLUME: 172.28 ML
LEFT VENTRICLE END SYSTOLIC VOLUME APICAL 2 CHAMBER: 59.21 ML
LEFT VENTRICLE END SYSTOLIC VOLUME APICAL 4 CHAMBER: 92.77 ML
LEFT VENTRICLE MASS INDEX: 102.1 G/M2
LEFT VENTRICLE SYSTOLIC VOLUME INDEX: 46.9 ML/M2
LEFT VENTRICLE SYSTOLIC VOLUME: 110.27 ML
LEFT VENTRICULAR INTERNAL DIMENSION IN DIASTOLE: 5.9 CM (ref 3.5–6)
LEFT VENTRICULAR MASS: 239.9 G
LV LATERAL E/E' RATIO: 4.75 M/S
LV SEPTAL E/E' RATIO: 10.86 M/S
LVED V (TEICH): 172.28 ML
LVES V (TEICH): 110.27 ML
LVOT MG: 2.3 MMHG
LVOT MV: 0.72 CM/S
MV PEAK A VEL: 0.87 M/S
MV PEAK E VEL: 0.76 M/S
MV STENOSIS PRESSURE HALF TIME: 36.44 MS
MV VALVE AREA P 1/2 METHOD: 6.04 CM2
PISA TR MAX VEL: 1.17 M/S
PULM VEIN S/D RATIO: 1.29
PV PEAK D VEL: 0.41 M/S
PV PEAK S VEL: 0.53 M/S
RA PRESSURE ESTIMATED: 3 MMHG
RIGHT VENTRICLE DIASTOLIC LENGTH: 8.1 CM
RIGHT VENTRICLE DIASTOLIC MID DIMENSION: 2.8 CM
RIGHT VENTRICULAR END-DIASTOLIC DIMENSION: 4.01 CM
RIGHT VENTRICULAR LENGTH IN DIASTOLE (APICAL 4-CHAMBER VIEW): 8.1 CM
RV MID DIAMA: 2.8 CM
RV TB RVSP: 4 MMHG
RV TISSUE DOPPLER FREE WALL SYSTOLIC VELOCITY 1 (APICAL 4 CHAMBER VIEW): 12.8 CM/S
SINUS: 2.97 CM
STJ: 3 CM
TDI LATERAL: 0.16 M/S
TDI SEPTAL: 0.07 M/S
TDI: 0.12 M/S
TR MAX PG: 5 MMHG
TRICUSPID ANNULAR PLANE SYSTOLIC EXCURSION: 2.59 CM
TV REST PULMONARY ARTERY PRESSURE: 8 MMHG
Z-SCORE OF LEFT VENTRICULAR DIMENSION IN END DIASTOLE: -5.1
Z-SCORE OF LEFT VENTRICULAR DIMENSION IN END SYSTOLE: -2.15

## 2024-10-08 PROCEDURE — 93306 TTE W/DOPPLER COMPLETE: CPT | Mod: PO

## 2024-10-08 PROCEDURE — 93306 TTE W/DOPPLER COMPLETE: CPT | Mod: 26,,, | Performed by: INTERNAL MEDICINE

## 2024-10-08 PROCEDURE — 93242 EXT ECG>48HR<7D RECORDING: CPT | Mod: PO

## 2024-10-08 PROCEDURE — 93243 EXT ECG>48HR<7D SCAN A/R: CPT | Mod: PO

## 2024-10-12 ENCOUNTER — PATIENT MESSAGE (OUTPATIENT)
Dept: ORTHOPEDICS | Facility: CLINIC | Age: 54
End: 2024-10-12
Payer: COMMERCIAL

## 2024-10-24 ENCOUNTER — PATIENT MESSAGE (OUTPATIENT)
Dept: PRIMARY CARE CLINIC | Facility: CLINIC | Age: 54
End: 2024-10-24
Payer: COMMERCIAL

## 2024-10-24 ENCOUNTER — PATIENT MESSAGE (OUTPATIENT)
Dept: ORTHOPEDICS | Facility: CLINIC | Age: 54
End: 2024-10-24
Payer: COMMERCIAL

## 2024-10-30 ENCOUNTER — PATIENT MESSAGE (OUTPATIENT)
Dept: ORTHOPEDICS | Facility: CLINIC | Age: 54
End: 2024-10-30
Payer: COMMERCIAL

## 2024-11-01 ENCOUNTER — OFFICE VISIT (OUTPATIENT)
Dept: PRIMARY CARE CLINIC | Facility: CLINIC | Age: 54
End: 2024-11-01
Payer: COMMERCIAL

## 2024-11-01 ENCOUNTER — LAB VISIT (OUTPATIENT)
Dept: LAB | Facility: HOSPITAL | Age: 54
End: 2024-11-01
Attending: PHYSICIAN ASSISTANT
Payer: COMMERCIAL

## 2024-11-01 VITALS
SYSTOLIC BLOOD PRESSURE: 132 MMHG | RESPIRATION RATE: 18 BRPM | OXYGEN SATURATION: 98 % | HEART RATE: 64 BPM | DIASTOLIC BLOOD PRESSURE: 69 MMHG | BODY MASS INDEX: 29.48 KG/M2 | WEIGHT: 235.88 LBS

## 2024-11-01 DIAGNOSIS — M46.1 SACROILIITIS: ICD-10-CM

## 2024-11-01 DIAGNOSIS — E11.59 HYPERTENSION ASSOCIATED WITH DIABETES: ICD-10-CM

## 2024-11-01 DIAGNOSIS — Z95.5 STENTED CORONARY ARTERY: ICD-10-CM

## 2024-11-01 DIAGNOSIS — I15.2 HYPERTENSION ASSOCIATED WITH DIABETES: ICD-10-CM

## 2024-11-01 DIAGNOSIS — I25.10 CORONARY ARTERY DISEASE INVOLVING NATIVE CORONARY ARTERY OF NATIVE HEART WITHOUT ANGINA PECTORIS: ICD-10-CM

## 2024-11-01 DIAGNOSIS — E11.621 TYPE 2 DIABETES MELLITUS WITH FOOT ULCER, WITH LONG-TERM CURRENT USE OF INSULIN: Primary | ICD-10-CM

## 2024-11-01 DIAGNOSIS — S68.119D AMPUTATION OF FINGER, SUBSEQUENT ENCOUNTER: ICD-10-CM

## 2024-11-01 DIAGNOSIS — Z23 FLU VACCINE NEED: ICD-10-CM

## 2024-11-01 DIAGNOSIS — K58.9 IRRITABLE BOWEL SYNDROME, UNSPECIFIED TYPE: ICD-10-CM

## 2024-11-01 DIAGNOSIS — Z79.4 TYPE 2 DIABETES MELLITUS WITH FOOT ULCER, WITH LONG-TERM CURRENT USE OF INSULIN: Primary | ICD-10-CM

## 2024-11-01 DIAGNOSIS — R00.0 TACHYCARDIA: ICD-10-CM

## 2024-11-01 DIAGNOSIS — L97.509 TYPE 2 DIABETES MELLITUS WITH FOOT ULCER, WITH LONG-TERM CURRENT USE OF INSULIN: Primary | ICD-10-CM

## 2024-11-01 DIAGNOSIS — S98.139A AMPUTATED TOE, UNSPECIFIED LATERALITY: ICD-10-CM

## 2024-11-01 DIAGNOSIS — E11.9 TYPE 2 DIABETES MELLITUS WITHOUT COMPLICATION: ICD-10-CM

## 2024-11-01 PROBLEM — S68.119A AMPUTATION FINGER: Status: ACTIVE | Noted: 2024-11-01

## 2024-11-01 LAB
ESTIMATED AVG GLUCOSE: 177 MG/DL (ref 68–131)
HBA1C MFR BLD: 7.8 % (ref 4–5.6)

## 2024-11-01 PROCEDURE — 83036 HEMOGLOBIN GLYCOSYLATED A1C: CPT | Performed by: PHYSICIAN ASSISTANT

## 2024-11-01 RX ORDER — AMLODIPINE BESYLATE 5 MG/1
5 TABLET ORAL EVERY MORNING
Qty: 90 TABLET | Refills: 1 | Status: SHIPPED | OUTPATIENT
Start: 2024-11-01

## 2024-11-01 RX ORDER — METFORMIN HYDROCHLORIDE 1000 MG/1
1000 TABLET ORAL 2 TIMES DAILY
Qty: 180 TABLET | Refills: 1 | Status: SHIPPED | OUTPATIENT
Start: 2024-11-01 | End: 2025-04-30

## 2024-11-01 RX ORDER — CLOPIDOGREL BISULFATE 75 MG/1
75 TABLET ORAL DAILY
Qty: 90 TABLET | Refills: 1 | Status: SHIPPED | OUTPATIENT
Start: 2024-11-01 | End: 2025-04-30

## 2024-11-01 RX ORDER — PEN NEEDLE, DIABETIC 33 GX5/32"
1 NEEDLE, DISPOSABLE MISCELLANEOUS 2 TIMES DAILY
Qty: 100 EACH | Refills: 11 | Status: SHIPPED | OUTPATIENT
Start: 2024-11-01

## 2024-11-01 RX ORDER — AMITRIPTYLINE HYDROCHLORIDE 50 MG/1
50 TABLET, FILM COATED ORAL NIGHTLY
Qty: 90 TABLET | Refills: 1 | Status: SHIPPED | OUTPATIENT
Start: 2024-11-01 | End: 2025-04-30

## 2024-11-01 RX ORDER — METOPROLOL SUCCINATE 50 MG/1
50 TABLET, EXTENDED RELEASE ORAL DAILY
Qty: 90 TABLET | Refills: 3 | Status: SHIPPED | OUTPATIENT
Start: 2024-11-01 | End: 2025-11-01

## 2024-11-01 RX ORDER — GABAPENTIN 300 MG/1
300 CAPSULE ORAL 3 TIMES DAILY
Qty: 270 CAPSULE | Refills: 1 | Status: SHIPPED | OUTPATIENT
Start: 2024-11-01

## 2024-11-01 RX ORDER — INSULIN GLARGINE 100 [IU]/ML
22 INJECTION, SOLUTION SUBCUTANEOUS 2 TIMES DAILY
Qty: 12 EACH | Refills: 1 | Status: SHIPPED | OUTPATIENT
Start: 2024-11-01

## 2024-11-01 RX ORDER — LOSARTAN POTASSIUM AND HYDROCHLOROTHIAZIDE 25; 100 MG/1; MG/1
1 TABLET ORAL EVERY MORNING
Qty: 90 TABLET | Refills: 1 | Status: SHIPPED | OUTPATIENT
Start: 2024-11-01

## 2024-11-07 ENCOUNTER — PATIENT MESSAGE (OUTPATIENT)
Dept: CARDIOLOGY | Facility: CLINIC | Age: 54
End: 2024-11-07
Payer: COMMERCIAL

## 2024-12-04 ENCOUNTER — PATIENT OUTREACH (OUTPATIENT)
Dept: ADMINISTRATIVE | Facility: HOSPITAL | Age: 54
End: 2024-12-04
Payer: COMMERCIAL

## 2024-12-04 NOTE — PROGRESS NOTES
Population Health Chart Review & Patient Outreach Details      Additional [facility] Health Notes:               Updates Requested / Reviewed:      Updated Care Coordination Note and Immunizations Reconciliation Completed or Queried: [facility] Topics Overdue:      [facility] Score: 3     Colon Cancer Screening  Eye Exam  LDCT Lung Screen                       Health Maintenance Topic(s) Outreach Outcomes & Actions Taken:    Eye Exam - Outreach Outcomes & Actions Taken  : Portal message sent.

## 2024-12-05 ENCOUNTER — PATIENT MESSAGE (OUTPATIENT)
Dept: PRIMARY CARE CLINIC | Facility: CLINIC | Age: 54
End: 2024-12-05
Payer: COMMERCIAL

## 2024-12-30 PROBLEM — I10 ESSENTIAL HYPERTENSION: Status: ACTIVE | Noted: 2023-07-28

## 2024-12-30 PROBLEM — Z89.021 HISTORY OF AMPUTATION OF FINGER OF RIGHT HAND: Status: ACTIVE | Noted: 2024-12-30

## 2024-12-30 PROBLEM — M86.9 TOE OSTEOMYELITIS, RIGHT: Status: ACTIVE | Noted: 2024-12-30

## 2024-12-30 PROBLEM — M79.2 NEUROPATHIC PAIN: Status: ACTIVE | Noted: 2024-12-30

## 2024-12-30 PROBLEM — Z86.718 HISTORY OF DVT (DEEP VEIN THROMBOSIS): Status: ACTIVE | Noted: 2024-12-30

## 2024-12-30 PROBLEM — G47.00 INSOMNIA: Status: ACTIVE | Noted: 2024-12-30

## 2024-12-30 PROBLEM — E11.65 TYPE 2 DIABETES MELLITUS WITH HYPERGLYCEMIA, WITH LONG-TERM CURRENT USE OF INSULIN: Status: ACTIVE | Noted: 2024-12-30

## 2024-12-30 PROBLEM — Z79.4 TYPE 2 DIABETES MELLITUS WITH HYPERGLYCEMIA, WITH LONG-TERM CURRENT USE OF INSULIN: Status: ACTIVE | Noted: 2024-12-30

## 2024-12-30 PROBLEM — M51.379 DDD (DEGENERATIVE DISC DISEASE), LUMBOSACRAL: Status: ACTIVE | Noted: 2024-12-30

## 2025-01-09 ENCOUNTER — TELEPHONE (OUTPATIENT)
Dept: PAIN MEDICINE | Facility: CLINIC | Age: 55
End: 2025-01-09
Payer: COMMERCIAL

## 2025-01-17 ENCOUNTER — OFFICE VISIT (OUTPATIENT)
Dept: PRIMARY CARE CLINIC | Facility: CLINIC | Age: 55
End: 2025-01-17
Payer: COMMERCIAL

## 2025-01-17 VITALS
WEIGHT: 236.13 LBS | HEIGHT: 75 IN | DIASTOLIC BLOOD PRESSURE: 88 MMHG | OXYGEN SATURATION: 98 % | HEART RATE: 97 BPM | BODY MASS INDEX: 29.36 KG/M2 | TEMPERATURE: 98 F | SYSTOLIC BLOOD PRESSURE: 132 MMHG

## 2025-01-17 DIAGNOSIS — L97.514 NON-PRESSURE CHRONIC ULCER OF OTHER PART OF RIGHT FOOT WITH NECROSIS OF BONE: ICD-10-CM

## 2025-01-17 DIAGNOSIS — E11.621 TYPE 2 DIABETES MELLITUS WITH FOOT ULCER, WITH LONG-TERM CURRENT USE OF INSULIN: Primary | ICD-10-CM

## 2025-01-17 DIAGNOSIS — L97.509 TYPE 2 DIABETES MELLITUS WITH FOOT ULCER, WITH LONG-TERM CURRENT USE OF INSULIN: Primary | ICD-10-CM

## 2025-01-17 DIAGNOSIS — I70.222 ATHEROSCLEROSIS OF NATIVE ARTERIES OF EXTREMITIES WITH REST PAIN, LEFT LEG: ICD-10-CM

## 2025-01-17 DIAGNOSIS — Z79.4 TYPE 2 DIABETES MELLITUS WITH FOOT ULCER, WITH LONG-TERM CURRENT USE OF INSULIN: Primary | ICD-10-CM

## 2025-01-17 DIAGNOSIS — R53.83 FATIGUE, UNSPECIFIED TYPE: ICD-10-CM

## 2025-01-17 PROCEDURE — 1160F RVW MEDS BY RX/DR IN RCRD: CPT | Mod: CPTII,S$GLB,, | Performed by: PHYSICIAN ASSISTANT

## 2025-01-17 PROCEDURE — 3079F DIAST BP 80-89 MM HG: CPT | Mod: CPTII,S$GLB,, | Performed by: PHYSICIAN ASSISTANT

## 2025-01-17 PROCEDURE — 3075F SYST BP GE 130 - 139MM HG: CPT | Mod: CPTII,S$GLB,, | Performed by: PHYSICIAN ASSISTANT

## 2025-01-17 PROCEDURE — 3008F BODY MASS INDEX DOCD: CPT | Mod: CPTII,S$GLB,, | Performed by: PHYSICIAN ASSISTANT

## 2025-01-17 PROCEDURE — 99214 OFFICE O/P EST MOD 30 MIN: CPT | Mod: S$GLB,,, | Performed by: PHYSICIAN ASSISTANT

## 2025-01-17 PROCEDURE — 1111F DSCHRG MED/CURRENT MED MERGE: CPT | Mod: CPTII,S$GLB,, | Performed by: PHYSICIAN ASSISTANT

## 2025-01-17 PROCEDURE — 1159F MED LIST DOCD IN RCRD: CPT | Mod: CPTII,S$GLB,, | Performed by: PHYSICIAN ASSISTANT

## 2025-01-17 RX ORDER — AMLODIPINE BESYLATE 10 MG/1
10 TABLET ORAL EVERY MORNING
Qty: 90 TABLET | Refills: 1 | Status: SHIPPED | OUTPATIENT
Start: 2025-01-17

## 2025-01-17 RX ORDER — TIRZEPATIDE 5 MG/.5ML
5 INJECTION, SOLUTION SUBCUTANEOUS
Qty: 4 PEN | Refills: 11 | Status: SHIPPED | OUTPATIENT
Start: 2025-01-17

## 2025-01-17 NOTE — PROGRESS NOTES
Subjective     Patient ID: Shauna Barrera is a 54 y.o. male.    Chief Complaint: Follow-up (Type 2 diabetes mellitus with foot ulcer, with long-term current use of insulin)    Patient is a 55 yo male who comes in today for a check up.     Patient Active Problem List:     SOB (shortness of breath)     Near syncope     Other fatigue     Essential hypertension     PAD (peripheral artery disease)     Tachycardia     Tobacco use     Mild carotid artery disease     Long term (current) use of antithrombotics/antiplatelets     CAD (coronary artery disease)     Stented coronary artery     Type 2 diabetes mellitus with foot ulcer, with long-term current use of insulin     Sacroiliitis     Amputation of toe: This is an ongoing problem. He desire to see another podiatrist      Amputation finger     Toe osteomyelitis, right     Type 2 diabetes mellitus with hyperglycemia, with long-term current use of insulin     Insomnia     Neuropathic pain     History of amputation of finger of right hand     DDD (degenerative disc disease), lumbosacral     History of DVT (deep vein thrombosis)    Past Medical History:  No date: Anticoagulant long-term use  No date: Arthritis  No date: Coronary artery disease  No date: Diabetes mellitus type I  07/28/2023: Hypertension associated with diabetes  No date: PONV (postoperative nausea and vomiting)           Follow-up  Pertinent negatives include no abdominal pain, chest pain, chills, fatigue or fever.     Review of Systems   Constitutional:  Negative for chills, fatigue and fever.   Respiratory:  Negative for chest tightness, shortness of breath and wheezing.    Cardiovascular:  Negative for chest pain.   Gastrointestinal:  Negative for abdominal pain.          Objective     Physical Exam  Vitals reviewed.   Constitutional:       General: He is not in acute distress.     Appearance: Normal appearance. He is not ill-appearing, toxic-appearing or diaphoretic.   HENT:      Head: Normocephalic  and atraumatic.   Neck:      Vascular: No carotid bruit.   Cardiovascular:      Rate and Rhythm: Normal rate and regular rhythm.      Pulses: Normal pulses.      Heart sounds: Normal heart sounds.   Pulmonary:      Effort: Pulmonary effort is normal.      Breath sounds: Normal breath sounds.   Abdominal:      Palpations: Abdomen is soft.      Tenderness: There is no abdominal tenderness.   Musculoskeletal:      Cervical back: No rigidity or tenderness.   Lymphadenopathy:      Cervical: No cervical adenopathy.   Neurological:      Mental Status: He is alert.            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  Dispense: 4 Pen; Refill: 11  -     amLODIPine (NORVASC) 10 MG tablet; Take 1 tablet (10 mg total) by mouth every morning.  Dispense: 90 tablet; Refill: 1       I spent 30 minutes on this encounter, time includes face-to-face, chart review, documentation, test review and orders.

## 2025-01-17 NOTE — LETTER
January 17, 2025    Shauna Barrera  [id number] [address] [address]             [address] [address]  Phone: [phone] To whom it may concern:     Do to a diabetic toe amputation, diabetic neuropathy and coronary artery disease. Mr Barrera can not ambulate in and work related environment and can not lift over 8 lbs to risk of dropping objects. Please feel free to contact my clinic for any more questions.       Sincerely,       [place]o [place] PA-C

## 2025-01-24 ENCOUNTER — PATIENT MESSAGE (OUTPATIENT)
Dept: OPHTHALMOLOGY | Facility: CLINIC | Age: 55
End: 2025-01-24
Payer: COMMERCIAL

## 2025-02-07 RX ORDER — AMITRIPTYLINE HYDROCHLORIDE 50 MG/1
50 TABLET, FILM COATED ORAL NIGHTLY
Qty: 90 TABLET | Refills: 1 | Status: SHIPPED | OUTPATIENT
Start: 2025-02-07

## 2025-02-07 RX ORDER — LOSARTAN POTASSIUM AND HYDROCHLOROTHIAZIDE 25; 100 MG/1; MG/1
1 TABLET ORAL EVERY MORNING
Qty: 90 TABLET | Refills: 1 | Status: SHIPPED | OUTPATIENT
Start: 2025-02-07

## 2025-02-11 ENCOUNTER — LAB VISIT (OUTPATIENT)
Dept: PRIMARY CARE CLINIC | Facility: CLINIC | Age: 55
End: 2025-02-11
Payer: COMMERCIAL

## 2025-02-11 DIAGNOSIS — R53.83 FATIGUE, UNSPECIFIED TYPE: ICD-10-CM

## 2025-02-11 LAB — TESTOST SERPL-MCNC: 545 NG/DL (ref 304–1227)

## 2025-02-11 PROCEDURE — 84403 ASSAY OF TOTAL TESTOSTERONE: CPT | Performed by: PHYSICIAN ASSISTANT

## 2025-04-04 ENCOUNTER — TELEPHONE (OUTPATIENT)
Dept: FAMILY MEDICINE | Facility: CLINIC | Age: 55
End: 2025-04-04

## 2025-06-05 ENCOUNTER — TELEPHONE (OUTPATIENT)
Dept: PODIATRY | Facility: CLINIC | Age: 55
End: 2025-06-05

## (undated) DEVICE — LAMINECTOMY: Brand: MEDLINE INDUSTRIES, INC.

## (undated) DEVICE — TOWEL OR BLU 16X26 STRL

## (undated) DEVICE — SOL  .9 1000ML BTL

## (undated) DEVICE — DRAPE SHEET LG

## (undated) DEVICE — STERILE POLYISOPRENE POWDER-FREE SURGICAL GLOVES: Brand: PROTEXIS

## (undated) DEVICE — USE ITEM #176901

## (undated) DEVICE — 3M™ TEGADERM™ TRANSPARENT FILM DRESSING, 1626W, 4 IN X 4-3/4 IN (10 CM X 12 CM), 50 EACH/CARTON, 4 CARTON/CASE: Brand: 3M™ TEGADERM™

## (undated) DEVICE — TRAP MCS 40ML 5IN PLS SCR CAP

## (undated) DEVICE — SPONGE LAP 18X18 XRAY STRL

## (undated) DEVICE — DRAPE C-ARM UNIVERSAL

## (undated) DEVICE — FORCEP CUSH BAY BP DISP 7.5IN

## (undated) DEVICE — NVM5 PROBE SNGL USE STER PKG

## (undated) DEVICE — STERILE LATEX POWDER-FREE SURGICAL GLOVESWITH NITRILE COATING: Brand: PROTEXIS

## (undated) DEVICE — SUTURE VICRYL 2-0 CT-2

## (undated) DEVICE — DRAIN SILICONE FLAT 10MM

## (undated) DEVICE — GOWN SURG AERO BLUE PERF LG

## (undated) DEVICE — GOWN SURG AERO BLUE PERF XLG

## (undated) DEVICE — CS5/5+ FASTPACK, 125ML, 150µ RES: Brand: HAEMONETICS CELL SAVER 5/5+ SYSTEMS

## (undated) DEVICE — DRAIN RESERVOIR RELIAVAC 100CC

## (undated) DEVICE — OCCLUSIVE GAUZE STRIP,3% BISMUTH TRIBROMOPHENATE IN PETROLATUM BLEND: Brand: XEROFORM

## (undated) DEVICE — NVM5 NEEDLE MODULE S

## (undated) DEVICE — TRAY FOLEY BDX 16F STATLOCK

## (undated) DEVICE — SUTURE VICRYL 0 CT-1

## (undated) DEVICE — DRAPE SRG 90X60IN BCK TBL CVR

## (undated) DEVICE — DRESSING BIOPATCH 1X4 CNTR

## (undated) DEVICE — PROXIMATE SKIN STAPLERS (35 WIDE) CONTAINS 35 STAINLESS STEEL STAPLES (FIXED HEAD): Brand: PROXIMATE

## (undated) DEVICE — 3M(TM) TEGADERM(TM) TRANSPARENT FILM DRESSING FRAME STYLE 1628: Brand: 3M™ TEGADERM™

## (undated) DEVICE — CONTAINER SPEC STR 4OZ GRY LID

## (undated) DEVICE — NVM5 NEEDLE MODULE M/E

## (undated) NOTE — LETTER
[address], [place], [place]     AUTHORIZATION FOR SURGICAL OPERATION OR PROCEDURE    I hereby authorize Dr. Sloane Chapman MD, my Physician(s) and whomever may be designated as the doctor's Assistant, to perform the foll own blood, or a directed donor transfusion, I will discuss this with my Physician. 4. I consent to the photographing of procedure(s) to be performed for the purposes of advancing medicine, science and/or education, provided my identity is not revealed.  If _______________________________________________________________ ____________________________  (Witness signature)                                                                                                  (Date)                                (Time)